# Patient Record
Sex: FEMALE | Race: WHITE | NOT HISPANIC OR LATINO | Employment: FULL TIME | URBAN - METROPOLITAN AREA
[De-identification: names, ages, dates, MRNs, and addresses within clinical notes are randomized per-mention and may not be internally consistent; named-entity substitution may affect disease eponyms.]

---
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
## 2021-01-11 ENCOUNTER — OFFICE VISIT (OUTPATIENT)
Dept: INTERNAL MEDICINE | Facility: CLINIC | Age: 21
End: 2021-01-11
Payer: COMMERCIAL

## 2021-01-11 ENCOUNTER — ANCILLARY PROCEDURE (OUTPATIENT)
Dept: GENERAL RADIOLOGY | Facility: CLINIC | Age: 21
End: 2021-01-11
Attending: NURSE PRACTITIONER
Payer: COMMERCIAL

## 2021-01-11 VITALS
SYSTOLIC BLOOD PRESSURE: 120 MMHG | DIASTOLIC BLOOD PRESSURE: 78 MMHG | HEART RATE: 91 BPM | BODY MASS INDEX: 28.12 KG/M2 | WEIGHT: 159.7 LBS | TEMPERATURE: 97.8 F | OXYGEN SATURATION: 97 %

## 2021-01-11 DIAGNOSIS — M79.605 PAIN OF LEFT LOWER EXTREMITY: ICD-10-CM

## 2021-01-11 DIAGNOSIS — M54.42 CHRONIC BILATERAL LOW BACK PAIN WITH LEFT-SIDED SCIATICA: ICD-10-CM

## 2021-01-11 DIAGNOSIS — G89.29 CHRONIC BILATERAL LOW BACK PAIN WITH LEFT-SIDED SCIATICA: ICD-10-CM

## 2021-01-11 DIAGNOSIS — Z23 NEED FOR PROPHYLACTIC VACCINATION AND INOCULATION AGAINST INFLUENZA: ICD-10-CM

## 2021-01-11 DIAGNOSIS — M54.42 CHRONIC BILATERAL LOW BACK PAIN WITH LEFT-SIDED SCIATICA: Primary | ICD-10-CM

## 2021-01-11 DIAGNOSIS — G89.29 CHRONIC BILATERAL LOW BACK PAIN WITH LEFT-SIDED SCIATICA: Primary | ICD-10-CM

## 2021-01-11 PROCEDURE — 90471 IMMUNIZATION ADMIN: CPT | Performed by: NURSE PRACTITIONER

## 2021-01-11 PROCEDURE — 99214 OFFICE O/P EST MOD 30 MIN: CPT | Mod: 25 | Performed by: NURSE PRACTITIONER

## 2021-01-11 PROCEDURE — 90686 IIV4 VACC NO PRSV 0.5 ML IM: CPT | Performed by: NURSE PRACTITIONER

## 2021-01-11 PROCEDURE — 72100 X-RAY EXAM L-S SPINE 2/3 VWS: CPT | Performed by: RADIOLOGY

## 2021-01-11 RX ORDER — GABAPENTIN 100 MG/1
100 CAPSULE ORAL
Qty: 60 CAPSULE | Refills: 0 | Status: SHIPPED | OUTPATIENT
Start: 2021-01-11 | End: 2021-02-10

## 2021-01-11 SDOH — HEALTH STABILITY: MENTAL HEALTH: HOW OFTEN DO YOU HAVE A DRINK CONTAINING ALCOHOL?: 2-4 TIMES A MONTH

## 2021-01-11 SDOH — HEALTH STABILITY: MENTAL HEALTH: HOW MANY STANDARD DRINKS CONTAINING ALCOHOL DO YOU HAVE ON A TYPICAL DAY?: 1 OR 2

## 2021-01-11 SDOH — HEALTH STABILITY: MENTAL HEALTH: HOW OFTEN DO YOU HAVE 6 OR MORE DRINKS ON ONE OCCASION?: NEVER

## 2021-01-11 NOTE — PATIENT INSTRUCTIONS
Go to radiology for x-ray of the low back (Lumbar)    Referral to Physical therapy; they will be contacting you    Will trial medication called Gabapentin 100 mg 1 capsule 30 minutes before bedtime x 3 days then increase to twice daily for back and leg pain.     Ok to take Tylenol (2 tablets every 8 hours) for breakthrough pain.    Flu shot given

## 2021-01-11 NOTE — PROGRESS NOTES
ASSESSMENT and PLAN:    Chronic bilateral low back pain with left-sided sciatica  - ordered Lumbar x-ray  - referral to PT  - trial Gabapentin 100 mg as directed  - ok to use Tylenol for breakthrough pain    Pain of left lower extremity  - trial Gabapentin 100 mg as directed  - ok to use Tylenol for breakthrough pain    Need for prophylactic vaccination and inoculation against influenza  - flu shot given    Patient instructions:    Go to radiology for x-ray of the low back (Lumbar)    Referral to Physical therapy; they will be contacting you    Will trial medication called Gabapentin 100 mg 1 capsule 30 minutes before bedtime x 3 days then increase to twice daily for back and leg pain.     Ok to take Tylenol (2 tablets every 8 hours) for breakthrough pain.    Flu shot given    Tiff BRENNER  [facility] Pratik Herring is a 20 year old who presents to clinic today for the following health issues  accompanied by herself:    HPI       She has pain in her back and in her legs with the left leg being the worse for a very long time. It has gotten worse over time. Last week her left leg was numb.    Reviewed PMH, SH, FH, and medications.  Being followed by Psychiatrist (new one).    See above.  Low back pain for some time but with some numbness going down left leg to mid calf x 1 week.  Taking Ibuprofen and Tylenol for pain helps some.  No injuries.  Stands a lot for work. Sleep ok.      Was worked up for the same complaints in 2019; saw Rheumatology due to KAREN positive and found not related to rheumatoid diseases.  Also had bilateral hip x-rays (normal).  Never went to the pain clinic as noted indicated referral.    No fever or cough.  No shortness of breathe or chest pain.  No stomach or urination issues.    Review of Systems   Constitutional, HEENT, cardiovascular, pulmonary, GI, , musculoskeletal, neuro, skin, endocrine and psych systems are negative, except as otherwise  noted.      Objective    /78 (BP Location: Right arm, Patient Position: Sitting, Cuff Size: Adult Regular)   Pulse 91   Temp 97.8  F (36.6  C) (Oral)   Wt 72.4 kg (159 lb 11.2 oz)   LMP  (LMP Unknown)   SpO2 97%   BMI 28.12 kg/m    Body mass index is 28.12 kg/m .     Physical Exam   GENERAL: alert and no distress  RESP: lungs clear to auscultation - no rales, rhonchi or wheezes  CV: regular rate and rhythm, normal S1 S2, no S3 or S4, no murmur, click or rub, no peripheral edema and peripheral pulses strong  MS: no gross musculoskeletal defects noted, no edema  SKIN: no suspicious lesions or rashes  BACK: mild left SI joint tenderness; no CVA tenderness, no paralumbar tenderness; no guarding with up and down movement.  SLR negative.  Good sensation and strength in lower legs

## 2021-01-14 ENCOUNTER — THERAPY VISIT (OUTPATIENT)
Dept: PHYSICAL THERAPY | Facility: CLINIC | Age: 21
End: 2021-01-14
Payer: COMMERCIAL

## 2021-01-14 DIAGNOSIS — M54.42 CHRONIC BILATERAL LOW BACK PAIN WITH LEFT-SIDED SCIATICA: ICD-10-CM

## 2021-01-14 DIAGNOSIS — M79.605 PAIN OF LEFT LOWER EXTREMITY: ICD-10-CM

## 2021-01-14 DIAGNOSIS — G89.29 CHRONIC BILATERAL LOW BACK PAIN WITH LEFT-SIDED SCIATICA: ICD-10-CM

## 2021-01-14 PROCEDURE — 97110 THERAPEUTIC EXERCISES: CPT | Mod: GP | Performed by: PHYSICAL THERAPIST

## 2021-01-14 PROCEDURE — 97161 PT EVAL LOW COMPLEX 20 MIN: CPT | Mod: GP | Performed by: PHYSICAL THERAPIST

## 2021-01-14 NOTE — PROGRESS NOTES
[facility] Initial Evaluation  Subjective:  Pt reports lumbar pain since childhood of unknown etiology. Pt has noted increased pain over the past 3 years. Pt referred by MD for physical therapy on 1-11-21    The history is provided by the patient. No  was used.   Therapist Generated HPI Evaluation         Type of problem:  Lumbar.    This is a chronic condition.  Condition occurred with:  Insidious onset.  Where condition occurred: for unknown reasons.  Patient reports pain:  Lumbar spine right and lumbar spine left.  Pain is described as aching and is constant.  Pain radiates to:  Gluteals left, thigh left, knee left, lower leg left and foot left. Pain is the same all the time.  Since onset symptoms are gradually worsening.  Associated symptoms:  Numbness, loss of motion/stiffness and loss of strength. Symptoms are exacerbated by lifting, sitting, carrying, standing and walking  and relieved by heat, analgesics and NSAID's (gabapentin).  Special tests included:  X-ray (see report).  Past treatment: none.   Restrictions due to condition include:  Working in normal job without restrictions.  Barriers include:  None as reported by patient.                        Objective:  Standing Alignment:        Lumbar deviations alignment: decreased lumbar lordosis.                Flexibility/Screens:       Lower Extremity:  Decreased left lower extremity flexibility:Hamstrings and Gastroc    Decreased right lower extremity flexibility:  Gastroc               Lumbar/SI Evaluation  ROM:    AROM Lumbar:   Flexion:          Minimal loss with pain  Ext:                    Moderate loss decreased lower extremity pain   Side Bend:        Left:  WNL    Right:  WNL  Rotation:           Left:     Right:   Side Glide:        Left:     Right:         Strength: weak pelvic stabilizers  Lumbar Myotomes:          L5 (Great Toe Ext): Left: 4        Lumbar DTR's:  normal        Lumbar Dermtomes:               L5 Left:  Hypo-light touch           Lumbar Palpation:  Palpation (lumbar): point tenderness L5-S1 spinous processes.                                                         General     ROS    Assessment/Plan:    Patient is a 20 year old female with lumbar complaints.    Patient has the following significant findings with corresponding treatment plan.                Diagnosis 1:  Chronic bilateral lumbar pain with left sided sciatica  Pain -  hot/cold therapy, self management, education, directional preference exercise and home program  Decreased ROM/flexibility - therapeutic exercise, therapeutic activity and home program  Decreased strength - therapeutic exercise, therapeutic activities and home program    Therapy Evaluation Codes:   1) History comprised of:   Personal factors that impact the plan of care:      Time since onset of symptoms.    Comorbidity factors that impact the plan of care are:      Depression, Implanted device, Mental illness, Numbness/tingling and Pain at night/rest.     Medications impacting care: Anti-depressant, Pain and gabapentin.  2) Examination of Body Systems comprised of:   Body structures and functions that impact the plan of care:      Lumbar spine.   Activity limitations that impact the plan of care are:      Sitting, Standing, Walking and Sleeping.  3) Clinical presentation characteristics are:   Stable/Uncomplicated.  4) Decision-Making    Low complexity using standardized patient assessment instrument and/or measureable assessment of functional outcome.  Cumulative Therapy Evaluation is: Low complexity.    Previous and current functional limitations:  (See Goal Flow Sheet for this information)    Short term and Long term goals: (See Goal Flow Sheet for this information)     Communication ability:  Patient appears to be able to clearly communicate and understand verbal and written communication and follow directions correctly.  Treatment Explanation - The following  has been discussed with the patient:   RX ordered/plan of care  Anticipated outcomes  Possible risks and side effects  This patient would benefit from PT intervention to resume normal activities.   Rehab potential is good.    Frequency:  1 X week, once daily  Duration:  for 6 weeks  Discharge Plan:  Achieve all LTG.  Independent in home treatment program.  Reach maximal therapeutic benefit.    Please refer to the daily flowsheet for treatment today, total treatment time and time spent performing 1:1 timed codes.

## 2021-01-22 ENCOUNTER — THERAPY VISIT (OUTPATIENT)
Dept: PHYSICAL THERAPY | Facility: CLINIC | Age: 21
End: 2021-01-22
Payer: COMMERCIAL

## 2021-01-22 DIAGNOSIS — M54.42 CHRONIC BILATERAL LOW BACK PAIN WITH LEFT-SIDED SCIATICA: ICD-10-CM

## 2021-01-22 DIAGNOSIS — G89.29 CHRONIC BILATERAL LOW BACK PAIN WITH LEFT-SIDED SCIATICA: ICD-10-CM

## 2021-01-22 DIAGNOSIS — M79.605 PAIN OF LEFT LOWER EXTREMITY: ICD-10-CM

## 2021-01-22 PROCEDURE — 97110 THERAPEUTIC EXERCISES: CPT | Mod: GP | Performed by: PHYSICAL THERAPIST

## 2021-01-22 PROCEDURE — 97530 THERAPEUTIC ACTIVITIES: CPT | Mod: GP | Performed by: PHYSICAL THERAPIST

## 2021-01-29 ENCOUNTER — THERAPY VISIT (OUTPATIENT)
Dept: PHYSICAL THERAPY | Facility: CLINIC | Age: 21
End: 2021-01-29
Payer: COMMERCIAL

## 2021-01-29 DIAGNOSIS — M54.42 CHRONIC BILATERAL LOW BACK PAIN WITH LEFT-SIDED SCIATICA: ICD-10-CM

## 2021-01-29 DIAGNOSIS — G89.29 CHRONIC BILATERAL LOW BACK PAIN WITH LEFT-SIDED SCIATICA: ICD-10-CM

## 2021-01-29 DIAGNOSIS — M79.605 PAIN OF LEFT LOWER EXTREMITY: ICD-10-CM

## 2021-01-29 PROCEDURE — 97530 THERAPEUTIC ACTIVITIES: CPT | Mod: GP | Performed by: PHYSICAL THERAPIST

## 2021-01-29 PROCEDURE — 97110 THERAPEUTIC EXERCISES: CPT | Mod: GP | Performed by: PHYSICAL THERAPIST

## 2021-01-29 NOTE — PROGRESS NOTES
Subjective:  HPI  Physical Exam                    Objective:  System    Physical Exam    General     ROS    Assessment/Plan:    SUBJECTIVE  Subjective changes as noted by pt:  Pt notes decreased pain in the lumbar region     Current pain level: 1/10     Changes in function:  Pt notes decreased pain at the end of her work shift     Adverse reaction to treatment or activity:  None    OBJECTIVE  Changes in objective findings:  Pt demonstrates improved hamstring flexibility. Lower abdominal strength improved.         ASSESSMENT  Nevaeh continues to require intervention to meet STG and LTG's: PT  Patient's symptoms are resolving.  Response to therapy has shown an improvement in  pain level, strength and function  Progress made towards STG/LTG?  Yes,     PLAN  Current treatment program is being advanced to more complex exercises.    PTA/ATC plan:  N/A    Please refer to the daily flowsheet for treatment today, total treatment time and time spent performing 1:1 timed codes.

## 2021-02-12 ENCOUNTER — THERAPY VISIT (OUTPATIENT)
Dept: PHYSICAL THERAPY | Facility: CLINIC | Age: 21
End: 2021-02-12
Payer: COMMERCIAL

## 2021-02-12 DIAGNOSIS — G89.29 CHRONIC BILATERAL LOW BACK PAIN WITH LEFT-SIDED SCIATICA: ICD-10-CM

## 2021-02-12 DIAGNOSIS — M54.42 CHRONIC BILATERAL LOW BACK PAIN WITH LEFT-SIDED SCIATICA: ICD-10-CM

## 2021-02-12 DIAGNOSIS — M79.605 PAIN OF LEFT LOWER EXTREMITY: ICD-10-CM

## 2021-02-12 PROCEDURE — 97110 THERAPEUTIC EXERCISES: CPT | Mod: GP | Performed by: PHYSICAL THERAPIST

## 2021-02-12 PROCEDURE — 97530 THERAPEUTIC ACTIVITIES: CPT | Mod: GP | Performed by: PHYSICAL THERAPIST

## 2021-02-12 NOTE — PROGRESS NOTES
Subjective:  HPI  Physical Exam                    Objective:  System    Physical Exam    General     ROS    Assessment/Plan:    SUBJECTIVE  Subjective changes as noted by pt:  Pt notes increased strength in the back     Current pain level: 1/10     Changes in function:  Pt notes decreased pain after standing at work      Adverse reaction to treatment or activity:  None    OBJECTIVE  Changes in objective findings:  Pt demonstrates improved pelvic stability and abdominal strength.         ASSESSMENT  Nevaeh continues to require intervention to meet STG and LTG's: PT  Patient's symptoms are resolving.  Response to therapy has shown an improvement in  strength  Progress made towards STG/LTG?  Yes,     PLAN  Current treatment program is being advanced to more complex exercises.    PTA/ATC plan:  N/A    Please refer to the daily flowsheet for treatment today, total treatment time and time spent performing 1:1 timed codes.

## 2021-04-19 DIAGNOSIS — G89.29 CHRONIC BILATERAL LOW BACK PAIN WITH LEFT-SIDED SCIATICA: ICD-10-CM

## 2021-04-19 DIAGNOSIS — M79.605 PAIN OF LEFT LOWER EXTREMITY: ICD-10-CM

## 2021-04-19 DIAGNOSIS — M54.42 CHRONIC BILATERAL LOW BACK PAIN WITH LEFT-SIDED SCIATICA: ICD-10-CM

## 2021-04-19 NOTE — TELEPHONE ENCOUNTER
Routing refill request to provider for review/approval because:  Drug not on the [facility], [facility] or [facility] refill protocol or controlled substance    Pattie B - Registered Nurse  [facility]  Acute and Diagnostic Services

## 2021-04-20 RX ORDER — GABAPENTIN 100 MG/1
CAPSULE ORAL
Qty: 60 CAPSULE | Refills: 1 | Status: SHIPPED | OUTPATIENT
Start: 2021-04-20 | End: 2021-07-14

## 2021-06-17 PROBLEM — G89.29 CHRONIC BILATERAL LOW BACK PAIN WITH LEFT-SIDED SCIATICA: Status: RESOLVED | Noted: 2021-01-14 | Resolved: 2021-06-17

## 2021-06-17 PROBLEM — M54.42 CHRONIC BILATERAL LOW BACK PAIN WITH LEFT-SIDED SCIATICA: Status: RESOLVED | Noted: 2021-01-14 | Resolved: 2021-06-17

## 2021-06-17 PROBLEM — M79.605 PAIN OF LEFT LOWER EXTREMITY: Status: RESOLVED | Noted: 2021-01-14 | Resolved: 2021-06-17

## 2021-06-17 NOTE — PROGRESS NOTES
Subjective:SUBJECTIVE  Subjective changes as noted by pt:  Pt notes increased strength in the back     Current pain level: 1/10     Changes in function:  Pt notes decreased pain after standing at work      Adverse reaction to treatment or activity:  None     OBJECTIVE  Changes in objective findings:  Pt demonstrates improved pelvic stability and abdominal strength  HPI  Physical Exam                    Objective:  System    Physical Exam    General     ROS    Assessment/Plan:    ASSESSMENT/PLAN  Updated problem list and treatment plan: Diagnosis 1:  Lumbar pain  Pain -  hot/cold therapy, self management, education and home program  Decreased ROM/flexibility - therapeutic exercise, therapeutic activity and home program  Decreased strength - therapeutic exercise, therapeutic activities and home program  Progress toward STG/LTGs have been made:  Yes,   Assessment of Progress: The patient's condition is improving.  Self Management Plans:  Patient has been instructed in a home treatment program.  I have re-evaluated this patient and find that the nature, scope, duration and intensity of the therapy is appropriate for the medical condition of the patient.  Nevaeh continues to require the following intervention to meet STG and LT's:  PT    Recommendations:  Pt has not returned for treatment since 2-21-21. Pt discharged at this time.      Please refer to the daily flowsheet for treatment today, total treatment time and time spent performing 1:1 timed codes.

## 2021-07-12 DIAGNOSIS — M79.605 PAIN OF LEFT LOWER EXTREMITY: ICD-10-CM

## 2021-07-12 DIAGNOSIS — M54.42 CHRONIC BILATERAL LOW BACK PAIN WITH LEFT-SIDED SCIATICA: ICD-10-CM

## 2021-07-12 DIAGNOSIS — G89.29 CHRONIC BILATERAL LOW BACK PAIN WITH LEFT-SIDED SCIATICA: ICD-10-CM

## 2021-07-14 RX ORDER — GABAPENTIN 100 MG/1
CAPSULE ORAL
Qty: 60 CAPSULE | Refills: 1 | Status: SHIPPED | OUTPATIENT
Start: 2021-07-14 | End: 2021-09-01

## 2021-07-14 NOTE — TELEPHONE ENCOUNTER
gabapentin (NEURONTIN) 100 MG capsule        Last Written Prescription Date:  4/20/2021  Last Fill Quantity: 60,   # refills: 1  Last Office Visit: 1/11/2021  Future Office visit:       Routing refill request to provider for review/approval because:  Drug not on the [facility], [facility] or [facility] refill protocol or controlled substance    Sandra Serrano RN

## 2021-07-30 ENCOUNTER — TRANSFERRED RECORDS (OUTPATIENT)
Dept: HEALTH INFORMATION MANAGEMENT | Facility: CLINIC | Age: 21
End: 2021-07-30

## 2021-08-09 ENCOUNTER — TRANSFERRED RECORDS (OUTPATIENT)
Dept: HEALTH INFORMATION MANAGEMENT | Facility: CLINIC | Age: 21
End: 2021-08-09

## 2021-08-09 ENCOUNTER — OFFICE VISIT (OUTPATIENT)
Dept: URGENT CARE | Facility: URGENT CARE | Age: 21
End: 2021-08-09
Payer: COMMERCIAL

## 2021-08-09 VITALS
BODY MASS INDEX: 26.41 KG/M2 | OXYGEN SATURATION: 99 % | DIASTOLIC BLOOD PRESSURE: 66 MMHG | SYSTOLIC BLOOD PRESSURE: 120 MMHG | RESPIRATION RATE: 20 BRPM | WEIGHT: 150 LBS | TEMPERATURE: 99.4 F | HEART RATE: 70 BPM

## 2021-08-09 DIAGNOSIS — R19.7 DIARRHEA, UNSPECIFIED TYPE: Primary | ICD-10-CM

## 2021-08-09 PROCEDURE — U0005 INFEC AGEN DETEC AMPLI PROBE: HCPCS | Performed by: PHYSICIAN ASSISTANT

## 2021-08-09 PROCEDURE — U0003 INFECTIOUS AGENT DETECTION BY NUCLEIC ACID (DNA OR RNA); SEVERE ACUTE RESPIRATORY SYNDROME CORONAVIRUS 2 (SARS-COV-2) (CORONAVIRUS DISEASE [COVID-19]), AMPLIFIED PROBE TECHNIQUE, MAKING USE OF HIGH THROUGHPUT TECHNOLOGIES AS DESCRIBED BY CMS-2020-01-R: HCPCS | Performed by: PHYSICIAN ASSISTANT

## 2021-08-09 PROCEDURE — 99213 OFFICE O/P EST LOW 20 MIN: CPT | Performed by: PHYSICIAN ASSISTANT

## 2021-08-09 NOTE — LETTER
[facility]  [address] [address]  [phone]      August 9, 2021    RE:  Nevaeh Mccann                                                                                                                                                       [address]  [facility] [address]            To whom it may concern:    Nevaeh Mccann was seen in clinic today.  Covid testing was done.  She will follow CDC's guidance on return to work after covid testing with symptoms.       Sincerely,        Sade Prakash PA-C    [facility]

## 2021-08-10 ENCOUNTER — LAB (OUTPATIENT)
Dept: LAB | Facility: CLINIC | Age: 21
End: 2021-08-10
Payer: COMMERCIAL

## 2021-08-10 DIAGNOSIS — R19.7 DIARRHEA, UNSPECIFIED TYPE: ICD-10-CM

## 2021-08-10 LAB — SARS-COV-2 RNA RESP QL NAA+PROBE: NEGATIVE

## 2021-08-10 PROCEDURE — 87506 IADNA-DNA/RNA PROBE TQ 6-11: CPT

## 2021-08-10 NOTE — PATIENT INSTRUCTIONS
"  Patient Education   After Your COVID-19 (Coronavirus) Test  You have been tested for COVID-19 (coronavirus).   If you'll have surgery in the next few days, we'll let you know ahead of time if you have the virus. Please call your surgeon's office with any questions.  For all other patients: Results are usually available in [employer] within 2 to 3 days.   If you do not have a [employer] account, you'll get a letter in the mail in about 7 to 10 days.   [employer] is often the fastest way to get test results. Please sign up if you do not already have a [employer] account. See the handout Getting COVID-19 Test Results in [employer] for help.  What if my test result is positive?  If your test is positive and you have not viewed your result in [employer], you'll get a phone call with your result. (A positive test means that you have the virus.)     Follow the tips under \"How do I self-isolate?\" below for 10 days (20 days if you have a weak immune system).    You don't need to be retested for COVID-19 before going back to school or work. As long as you're fever-free and feeling better, you can go back to school, work and other activities after waiting the 10 or 20 days.  What if I have questions after I get my results?  If you have questions about your results, please visit our testing website at [email or web address].   After 7 to 10 days, if you have not gotten your results:     Call [phone] ([phone]) and ask to speak with our COVID-19 results team.    If you're being treated at an infusion center: Call your infusion center directly.  What are the symptoms of COVID-19?  Cough, fever and trouble breathing are the most common signs of COVID-19.  Other symptoms can include new headaches, new muscle or body aches, new and unexplained fatigue (feeling very tired), chills, sore throat, congestion (stuffy or runny nose), diarrhea (loose poop), loss of taste or smell, belly pain, and nausea or vomiting " "(feeling sick to your stomach or throwing up).  You may already have symptoms of COVID-19, or they may show up later.  What should I do if I have symptoms?  If you're having surgery: Call your surgeon's office.  For all other patients: Stay home and away from others (self-isolate) until ...    You've had no fever--and no medicine that reduces fever--for 1 full day (24 hours), AND    Other symptoms have gotten better. For example, your cough or breathing has improved, AND    At least 10 days have passed since your symptoms first started.  How do I self-isolate?    Stay in your own room, even for meals. Use your own bathroom if you can.    Stay away from others in your home. No hugging, kissing or shaking hands. No visitors.    Don't go to work, school or anywhere else.    Clean \"high touch\" surfaces often (doorknobs, counters, handles). Use household cleaning spray or wipes. You'll find a full list of  on the EPA website: www.epa.gov/pesticide-registration/list-n-disinfectants-use-against-sars-cov-2.    Cover your mouth and nose with a mask or other face covering to avoid spreading germs.    Wash your hands and face often. Use soap and water.    Caregivers in these groups are at risk for severe illness due to COVID-19:  ? People 65 years and older  ? People who live in a nursing home or long-term care facility  ? People with chronic disease (lung, heart, cancer, diabetes, kidney, liver, immunologic)  ? People who have a weakened immune system, including those who:    Are in cancer treatment    Take medicine that weakens the immune system, such as corticosteroids    Had a bone marrow or organ transplant    Have an immune deficiency    Have poorly controlled HIV or AIDS    Are obese (body mass index of 40 or higher)    Smoke regularly    Caregivers should wear gloves while washing dishes, handling laundry and cleaning bedrooms and bathrooms.    Use caution when washing and drying laundry: Don't shake dirty " laundry and use the warmest water setting that you can.    For more tips on managing your health at home, go to www.cdc.gov/coronavirus/2019-ncov/downloads/10Things.pdf.  How can I take care of myself at home?  1. Get lots of rest. Drink extra fluids (unless a doctor has told you not to).  2. Take Tylenol (acetaminophen) for fever or pain. If you have liver or kidney problems, ask your family doctor if it's OK to take Tylenol.   Adults can take either:  ? 650 mg (two 325 mg pills) every 4 to 6 hours, or   ? 1,000 mg (two 500 mg pills) every 8 hours as needed.  ? Note: Don't take more than 3,000 mg in one day. Acetaminophen is found in many medicines (both prescribed and over-the-counter medicines). Read all labels to be sure you don't take too much.   For children, check the Tylenol bottle for the right dose. The dose is based on the child's age or weight.  3. If you have other health problems (like cancer, heart failure, an organ transplant or severe kidney disease): Call your specialty clinic if you don't feel better in the next 2 days.  4. Know when to call 911. Emergency warning signs include:  ? Trouble breathing or shortness of breath  ? Chest pain or pressure that doesn't go away  ? Feeling confused like you haven't felt before, or not being able to wake up  ? Bluish-colored lips or face  5. If your doctor prescribed a blood thinner medicine: Follow their instructions.  Where can I get more information?    [facility] - About COVID-19:   [email or web address]    CDC - If You're Sick: cdc.gov/coronavirus/2019-ncov/about/steps-when-sick.html    CDC - Ending Home Isolation: www.cdc.gov/coronavirus/2019-ncov/hcp/disposition-in-home-patients.html    CDC - Caring for Someone: www.cdc.gov/coronavirus/2019-ncov/if-you-are-sick/care-for-someone.html    [facility] - Interim Guidance for Hospital Discharge to Home: www.health.[facility].[place].us/diseases/coronavirus/hcp/hospdischarge.pdf    [facility]  clinical trials (COVID-19 research studies): clinicalaffairs.[facility]-clinical-trials    Below are the COVID-19 hotlines at the [place] Department of Health ([facility]). Interpreters are available.  ? For health questions: Call 908-765-6883 or 1-800.893.8203 (7 a.m. to 7 p.m.)  ? For questions about schools and childcare: Call 814-423-8565 or 1-957.233.9841 (7 a.m. to 7 p.m.)    For informational purposes only. Not to replace the advice of your health care provider. Clinically reviewed by Infection Prevention and [facility] COVID-19 Clinical Team. Copyright   2020 [employer]. All rights reserved. [employer] [id number] - Rev 11/11/20.       Patient Education     Diarrhea with Uncertain Cause (Adult)    Diarrhea is when stools are loose and watery. This can be caused by:    Viral infections    Bacterial infections    Food poisoning    Parasites    Irritable bowel syndrome (IBS)    Inflammatory bowel diseases such as ulcerative colitis, Crohn's disease, and celiac disease    Food intolerance, such as to lactose, the sugar found in milk and milk products    Reaction to medicines like antibiotics, laxatives, cancer drugs, and antacids  Along with diarrhea, you may also have:    Abdominal pain and cramping    Nausea and vomiting    Loss of bowel control    Fever and chills    Bloody stools  In some cases, antibiotics may help to treat diarrhea. You may have a stool sample test. This is done to see what is causing your diarrhea, and if antibiotics will help treat it. The results of a stool sample test may take up to 2 days. The healthcare provider may not give you antibiotics until he or she has the stool test results.  Diarrhea can cause dehydration. This is the loss of too much water and other fluids from the body. When this occurs, body fluid must be replaced. This can be done with oral rehydration solutions. Oral rehydration solutions are available at drugstores and grocery stores without a  prescription. Sports drinks are not the best choice if you are very dehydrated. They have too much sugar and not enough electrolytes.  Home care  Follow all instructions given by your healthcare provider. Rest at home for the next 24 hours, or until you feel better. Avoid caffeine, tobacco, and alcohol. These can make diarrhea, cramping, and pain worse.  If taking medicines:    Over-the-counter nausea and diarrhea medicines are generally OK unless you experience fever or blood stool. Check with your doctor first in those circumstances.    You may use acetaminophen or NSAID medicines like ibuprofen or naproxen to reduce pain and fever. Don t use these if you have chronic liver or kidney disease, or ever had a stomach ulcer or gastrointestinal bleeding. Don't use NSAID medicines if you are already taking one for another condition (like arthritis) or are on daily aspirin therapy (such as for heart disease or after a stroke). Talk with your healthcare provider first.    If antibiotics were prescribed, be sure you take them until they are finished. Don t stop taking them even when you feel better. Antibiotics must be taken as a full course.  To prevent the spread of illness:    Remember that washing with soap and water and using alcohol-based  is the best way to prevent the spread of infection. Dry your hands with a single use towel (like a paper towel).    Clean the toilet after each use.    Wash your hands before eating.    Wash your hands before and after preparing food. Keep in mind that people with diarrhea or vomiting should not prepare food for others.    Wash your hands after using cutting boards, countertops, and knives that have been in contact with raw foods.    Wash and then peel fruits and vegetables.    Keep uncooked meats away from cooked and ready-to-eat foods.    Use a food thermometer when cooking. Cook poultry to at least 165 F (74 C). Cook ground meat (beef, veal, pork, lamb) to at least  160 F (71 C). Cook fresh beef, veal, lamb, and pork to at least 145 F (63 C).    Don t eat raw or undercooked eggs (poached or nam side up), poultry, meat, or unpasteurized milk and juices.  Food and drinks  The main goal while treating vomiting or diarrhea is to prevent dehydration. This is done by taking small amounts of liquids often.    Keep in mind that liquids are more important than food right now.    Drink only small amounts of liquids at a time.    Don t force yourself to eat, especially if you are having cramping, vomiting, or diarrhea. Don t eat large amounts at a time, even if you are hungry.    If you eat, avoid fatty, greasy, spicy, or fried foods.    Don t eat dairy foods or drink milk if you have diarrhea. These can make diarrhea worse.  During the first 24 hours you can try:    Oral rehydration solutions.  Sports drinks may be used if you are not too dehydrated and are otherwise healthy.    Soft drinks without caffeine    Ginger ale    Water (plain or flavored)    Decaf tea or coffee    Clear broth, consommé, or bouillon    Gelatin, popsicles, or frozen fruit juice bars  The second 24 hours, if you are feeling better, you can add:    Hot cereal, plain toast, bread, rolls, or crackers    Plain noodles, rice, mashed potatoes, chicken noodle soup, or rice soup    Unsweetened canned fruit (no pineapple)    Bananas  As you recover:    Limit fat intake to less than 15 grams per day. Don t eat margarine, butter, oils, mayonnaise, sauces, gravies, fried foods, peanut butter, meat, poultry, or fish.    Limit fiber. Don t eat raw or cooked vegetables, fresh fruits except bananas, or bran cereals.    Limit caffeine and chocolate.    Limit dairy.    Don t use spices or seasonings except salt.    Go back to your normal diet over time, as you feel better and your symptoms improve.    If the symptoms come back, go back to a simple diet or clear liquids.  Follow-up care  Follow up with your healthcare provider,  or as advised. If a stool sample was taken or cultures were done, call the healthcare provider for the results as instructed.  Call 911  Call 911 if you have any of these symptoms:    Trouble breathing    Confusion    Extreme drowsiness or trouble walking    Loss of consciousness    Rapid heart rate    Chest pain    Stiff neck    Seizure  When to seek medical advice  Call your healthcare provider right away if any of these occur:    Abdominal pain that gets worse    Constant lower right abdominal pain    Continued vomiting and inability to keep liquids down    Diarrhea more than 5 times a day    Blood in vomit or stool    Dark urine or no urine for 8 hours, dry mouth and tongue, tiredness, weakness, or dizziness    Drowsiness    New rash    You don t get better in 2 to 3 days    Fever of 100.4 F (38 C) or higher, or as directed by your healthcare provider  Karlie last reviewed this educational content on 6/1/2018 2000-2021 [employer]. All rights reserved. This information is not intended as a substitute for professional medical care. Always follow your healthcare professional's instructions.

## 2021-08-10 NOTE — PROGRESS NOTES
Patient presents with:  Diarrhea: for 2 weeks, and some abdominal cramping. In last week had 2 COVID tests and both were negative  Nausea: and occ vom    (R19.7) Diarrhea, unspecified type  (primary encounter diagnosis)  Comment:   Plan: Enteric Bacteria and Virus Panel by SCOTTY Stool,         Symptomatic COVID-19 Virus (Coronavirus) by PCR        Nasopharyngeal, Nursing Communication 1          Follow covid isolation guidelines.    See handout on Covid testing and Diarrhea.    F/u with PCP should symptoms persist or worsen.            SUBJECTIVE:   Nevaeh Mccann is a 21 year old female who presents today with diarrhea and nausea with occasional vomiting.      8-10 episodes of diarrhea daily for the past 4 days.  Onset of diarrhea was 2 weeks ago.      Low grade fever.    Last covid test was today the test was rapid.    Also had a covid test just over a week ago when it started.      Has been around Covid at her work.      Vomited once today    No travel        Past Medical History:   Diagnosis Date     Anxiety      Depression      IUD (intrauterine device) in place 08/2017    Kyleena IUD     Polysubstance abuse (H)     started at age 15 years     PTSD (post-traumatic stress disorder)      Self-injurious behavior          Current Outpatient Medications   Medication Sig Dispense Refill     Multiple Vitamins-Iron (DAILY-JOANNA/IRON/BETA-CAROTENE) TABS TAKE 1 TABLET BY MOUTH DAILY. (Patient not taking: Reported on 10/19/2020) 30 tablet 7     Social History     Tobacco Use     Smoking status: Never Smoker     Smokeless tobacco: Never Used   Substance Use Topics     Alcohol use: Not on file     Family History   Problem Relation Age of Onset     Diabetes Mother      Diabetes Father          ROS:    10 point ROS of systems including Constitutional, Eyes, Respiratory, Cardiovascular, Gastroenterology, Genitourinary, Integumentary, Muscularskeletal, Psychiatric ,neurological were all negative except for pertinent positives  noted in my HPI       OBJECTIVE:  /66   Pulse 70   Temp 99.4  F (37.4  C)   Resp 20   Wt 68 kg (150 lb)   LMP  (LMP Unknown)   SpO2 99%   BMI 26.41 kg/m    Physical Exam:  GENERAL APPEARANCE: healthy, alert and no distress  EYES: EOMI,  PERRL, conjunctiva clear  HENT: ear canals and TM's normal.  Nose and mouth without ulcers, erythema or lesions  NECK: supple, nontender, no lymphadenopathy  RESP: lungs clear to auscultation - no rales, rhonchi or wheezes  CV: regular rates and rhythm, normal S1 S2, no murmur noted  ABDOMEN:  soft, nontender, no HSM or masses and bowel sounds normal  NEURO: Normal strength and tone, sensory exam grossly normal,  normal speech and mentation  SKIN: no suspicious lesions or rashes

## 2021-08-16 PROCEDURE — 96374 THER/PROPH/DIAG INJ IV PUSH: CPT

## 2021-08-16 PROCEDURE — 93005 ELECTROCARDIOGRAM TRACING: CPT

## 2021-08-16 PROCEDURE — 99284 EMERGENCY DEPT VISIT MOD MDM: CPT | Mod: 25

## 2021-08-17 ENCOUNTER — HOSPITAL ENCOUNTER (EMERGENCY)
Facility: CLINIC | Age: 21
Discharge: HOME | End: 2021-08-17
Attending: EMERGENCY MEDICINE | Admitting: EMERGENCY MEDICINE
Payer: COMMERCIAL

## 2021-08-17 VITALS
HEART RATE: 75 BPM | SYSTOLIC BLOOD PRESSURE: 109 MMHG | TEMPERATURE: 98.6 F | RESPIRATION RATE: 18 BRPM | DIASTOLIC BLOOD PRESSURE: 67 MMHG | OXYGEN SATURATION: 100 %

## 2021-08-17 DIAGNOSIS — R10.9 ABDOMINAL PAIN, UNSPECIFIED ABDOMINAL LOCATION: ICD-10-CM

## 2021-08-17 DIAGNOSIS — R42 LIGHTHEADEDNESS: ICD-10-CM

## 2021-08-17 LAB
ALBUMIN SERPL-MCNC: 4.2 G/DL (ref 3.4–5)
ALBUMIN UR-MCNC: NEGATIVE MG/DL
ALP SERPL-CCNC: 63 U/L (ref 40–150)
ALT SERPL W P-5'-P-CCNC: 19 U/L (ref 0–50)
ANION GAP SERPL CALCULATED.3IONS-SCNC: 3 MMOL/L (ref 3–14)
APPEARANCE UR: CLEAR
AST SERPL W P-5'-P-CCNC: 10 U/L (ref 0–45)
ATRIAL RATE - MUSE: 75 BPM
BASOPHILS # BLD AUTO: 0 10E3/UL (ref 0–0.2)
BASOPHILS NFR BLD AUTO: 0 %
BILIRUB SERPL-MCNC: 0.2 MG/DL (ref 0.2–1.3)
BILIRUB UR QL STRIP: NEGATIVE
BUN SERPL-MCNC: 9 MG/DL (ref 7–30)
CALCIUM SERPL-MCNC: 9.2 MG/DL (ref 8.5–10.1)
CHLORIDE BLD-SCNC: 109 MMOL/L (ref 94–109)
CO2 SERPL-SCNC: 29 MMOL/L (ref 20–32)
COLOR UR AUTO: ABNORMAL
CREAT SERPL-MCNC: 0.64 MG/DL (ref 0.52–1.04)
DIASTOLIC BLOOD PRESSURE - MUSE: NORMAL MMHG
EOSINOPHIL # BLD AUTO: 0 10E3/UL (ref 0–0.7)
EOSINOPHIL NFR BLD AUTO: 1 %
ERYTHROCYTE [DISTWIDTH] IN BLOOD BY AUTOMATED COUNT: 11.1 % (ref 10–15)
GFR SERPL CREATININE-BSD FRML MDRD: >90 ML/MIN/1.73M2
GLUCOSE BLD-MCNC: 85 MG/DL (ref 70–99)
GLUCOSE UR STRIP-MCNC: NEGATIVE MG/DL
HCT VFR BLD AUTO: 41.4 % (ref 35–47)
HGB BLD-MCNC: 13.8 G/DL (ref 11.7–15.7)
HGB UR QL STRIP: ABNORMAL
IMM GRANULOCYTES # BLD: 0 10E3/UL
IMM GRANULOCYTES NFR BLD: 0 %
INTERPRETATION ECG - MUSE: NORMAL
KETONES UR STRIP-MCNC: NEGATIVE MG/DL
LEUKOCYTE ESTERASE UR QL STRIP: ABNORMAL
LIPASE SERPL-CCNC: 65 U/L (ref 73–393)
LYMPHOCYTES # BLD AUTO: 2.5 10E3/UL (ref 0.8–5.3)
LYMPHOCYTES NFR BLD AUTO: 31 %
MCH RBC QN AUTO: 30.9 PG (ref 26.5–33)
MCHC RBC AUTO-ENTMCNC: 33.3 G/DL (ref 31.5–36.5)
MCV RBC AUTO: 93 FL (ref 78–100)
MONOCYTES # BLD AUTO: 0.6 10E3/UL (ref 0–1.3)
MONOCYTES NFR BLD AUTO: 7 %
MUCOUS THREADS #/AREA URNS LPF: PRESENT /LPF
NEUTROPHILS # BLD AUTO: 5 10E3/UL (ref 1.6–8.3)
NEUTROPHILS NFR BLD AUTO: 61 %
NITRATE UR QL: NEGATIVE
NRBC # BLD AUTO: 0 10E3/UL
NRBC BLD AUTO-RTO: 0 /100
P AXIS - MUSE: 43 DEGREES
PH UR STRIP: 6.5 [PH] (ref 5–7)
PLATELET # BLD AUTO: 266 10E3/UL (ref 150–450)
POTASSIUM BLD-SCNC: 3.7 MMOL/L (ref 3.4–5.3)
PR INTERVAL - MUSE: 140 MS
PROT SERPL-MCNC: 7.3 G/DL (ref 6.8–8.8)
QRS DURATION - MUSE: 84 MS
QT - MUSE: 374 MS
QTC - MUSE: 417 MS
R AXIS - MUSE: 77 DEGREES
RBC # BLD AUTO: 4.47 10E6/UL (ref 3.8–5.2)
RBC URINE: 3 /HPF
SODIUM SERPL-SCNC: 141 MMOL/L (ref 133–144)
SP GR UR STRIP: 1.02 (ref 1–1.03)
SQUAMOUS EPITHELIAL: 5 /HPF
SYSTOLIC BLOOD PRESSURE - MUSE: NORMAL MMHG
T AXIS - MUSE: 57 DEGREES
UROBILINOGEN UR STRIP-MCNC: NORMAL MG/DL
VENTRICULAR RATE- MUSE: 75 BPM
WBC # BLD AUTO: 8.1 10E3/UL (ref 4–11)
WBC URINE: 1 /HPF

## 2021-08-17 PROCEDURE — 36415 COLL VENOUS BLD VENIPUNCTURE: CPT | Performed by: EMERGENCY MEDICINE

## 2021-08-17 PROCEDURE — 250N000013 HC RX MED GY IP 250 OP 250 PS 637: Performed by: EMERGENCY MEDICINE

## 2021-08-17 PROCEDURE — 82247 BILIRUBIN TOTAL: CPT | Performed by: EMERGENCY MEDICINE

## 2021-08-17 PROCEDURE — 83690 ASSAY OF LIPASE: CPT | Performed by: EMERGENCY MEDICINE

## 2021-08-17 PROCEDURE — 85025 COMPLETE CBC W/AUTO DIFF WBC: CPT | Performed by: EMERGENCY MEDICINE

## 2021-08-17 PROCEDURE — 250N000009 HC RX 250: Performed by: EMERGENCY MEDICINE

## 2021-08-17 PROCEDURE — 81001 URINALYSIS AUTO W/SCOPE: CPT | Performed by: EMERGENCY MEDICINE

## 2021-08-17 PROCEDURE — 258N000003 HC RX IP 258 OP 636: Performed by: EMERGENCY MEDICINE

## 2021-08-17 RX ORDER — ACETAMINOPHEN 500 MG
1000 TABLET ORAL ONCE
Status: COMPLETED | OUTPATIENT
Start: 2021-08-17 | End: 2021-08-17

## 2021-08-17 RX ORDER — FAMOTIDINE 20 MG/1
20 TABLET, FILM COATED ORAL DAILY
Qty: 10 TABLET | Refills: 0 | Status: SHIPPED | OUTPATIENT
Start: 2021-08-17 | End: 2021-08-19

## 2021-08-17 RX ORDER — ONDANSETRON 4 MG/1
4 TABLET, ORALLY DISINTEGRATING ORAL EVERY 8 HOURS PRN
Qty: 10 TABLET | Refills: 0 | Status: SHIPPED | OUTPATIENT
Start: 2021-08-17 | End: 2021-08-20

## 2021-08-17 RX ORDER — SUCRALFATE 1 G/1
1 TABLET ORAL 4 TIMES DAILY
Qty: 20 TABLET | Refills: 0 | Status: SHIPPED | OUTPATIENT
Start: 2021-08-17 | End: 2021-08-19

## 2021-08-17 RX ADMIN — LIDOCAINE HYDROCHLORIDE 30 ML: 20 SOLUTION ORAL; TOPICAL at 01:11

## 2021-08-17 RX ADMIN — ACETAMINOPHEN 1000 MG: 500 TABLET, FILM COATED ORAL at 01:31

## 2021-08-17 RX ADMIN — SODIUM CHLORIDE 1000 ML: 9 INJECTION, SOLUTION INTRAVENOUS at 01:11

## 2021-08-17 RX ADMIN — FAMOTIDINE 20 MG: 10 INJECTION, SOLUTION INTRAVENOUS at 01:11

## 2021-08-17 ASSESSMENT — ENCOUNTER SYMPTOMS
NAUSEA: 1
ABDOMINAL PAIN: 1
DIARRHEA: 1
DIZZINESS: 1
CONFUSION: 1
PALPITATIONS: 1
LIGHT-HEADEDNESS: 1
BLOOD IN STOOL: 0
VOMITING: 0
DYSURIA: 0

## 2021-08-17 NOTE — ED PROVIDER NOTES
History   Chief Complaint:  Abdominal Pain       History is provided by the patient.    CHANDRAKANT Mccann is a 21 year old female with history of polysubstance abuse who presents with myriad of complaints. She states that for the last couple weeks she has been having dull, cramping, epigastric abdominal pains with associated nausea and occasional diarrhea. She says the nausea is worse after eating. Today the abdominal pains worsened and have felt sharp. However, she presents to the ED tonight after having heart palpitations, lightheadedness, dizziness, and general confusion while she was with her friends. She states the palpitations, dizziness and confusion have resolved.  She denies any chest pains, vomiting,  blood in stool, or dysuria. She was recently feeling sick with a cold and had gotten COVID tested twice in the last 2 weeks, both resulting negatively. She is COVID vaccinated. She takes Tylenol and Advil daily. Her last period was 2 weeks ago and she has an IUD.  No ETOH or drug use today though she does admit to marijuana use.     Review of Systems   Cardiovascular: Positive for palpitations. Negative for chest pain.   Gastrointestinal: Positive for abdominal pain (epigastric), diarrhea and nausea. Negative for blood in stool and vomiting.   Genitourinary: Negative for dysuria.   Neurological: Positive for dizziness (resolved) and light-headedness (resolved).   Psychiatric/Behavioral: Positive for confusion (resolved).   All other systems reviewed and are negative.    Allergies:  Azithromycin  Penicillins  Sulfamethoxazole with Trimethoprim    Medications:  Neurontin  Levonorgestrel  Effexor-ER  Cholecalciferol  Buspar    Past Medical History:    Anxiety  Depression  IUD  Polysubstance abuse  PTSD  Self injurious behavior  Genital warts  Suicide attempt  Tylenol overdose   Seborrheic dermatitis  Orthostasis    Past Surgical History:    Tonsillectomy     Family History:    Diabetes  Asthma  Atrial  fibrillation  Migraines  Fibromyalgia  Alcoholism  Brain cancer  Suicide    Social History:  Patient was brought to the ED by her friends.  Patient affirms mairjuana use.  Patient affirms occasional alcohol use. She denies any usage today.    Physical Exam     Patient Vitals for the past 24 hrs:   BP Temp Temp src Pulse Resp SpO2   08/17/21 0115 -- -- -- 75 -- 100 %   08/17/21 0100 109/67 -- -- 62 -- 99 %   08/17/21 0045 117/79 -- -- 78 -- 97 %   08/16/21 2359 139/85 98.6  F (37  C) Oral 99 18 98 %       Physical Exam  General: Well-nourished, nontoxic  Eyes: PERRL, EOMI, no nystagmus.  No scleral icterus or conjunctival injection.    ENT:  Moist mucus membranes, posterior oropharynx clear without erythema or exudates. TM normal bilaterally  Neck: Supple with full range of motion  Respiratory:  Lungs clear to auscultation bilaterally, no crackles/rubs/wheezes.  Good air movement  CV: Normal rate and rhythm, no murmurs/rubs/gallops  GI:  Abdomen soft and non-distended.  No tenderness, guarding or rebound  Skin: Warm, dry.  No rashes or petechiae  MSK: No peripheral edema or calf tenderness  Neuro: Alert and oriented to person/place/time.  No aphasia/facial droop/dysarthria.  Tongue midline, normal strength at SCM/trapezius/BUE/BLE.  Normal finger to nose. Normal gait.  Sensation intact over face/BUE/BLE  Psychiatric: Flat affect      Emergency Department Course     ECG  ECG taken at 0045, ECG read at 0046  Normal sinus rhythm  Normal ECG   Rate 75 bpm. NE interval 140 ms. QRS duration 84 ms. QT/QTc 374/417 ms. P-R-T axes 43 77 57.     Laboratory:    CBC: WBC 8.1, HGB 13.8,      CMP: AWNL (Creatinine 0.64)     Lipase: 65 (L)    UA with microscopic: Blood Small (A), Leukocyte Esterase Trace (A), Mucus Present (A), RBC 3 (H), Squamous Epithelial 5 (H), o/w WNL     Emergency Department Course:    Reviewed:  I reviewed nursing notes, vitals, past medical history and care everywhere    Assessments:  0028 I obtained  history and examined the patient as noted above.   0121 I rechecked the patient and explained findings.     Interventions:  0111 Pepcid 20 mg IV  0111 NS 1000 mL IV  0111 GI cocktail 30 mL PO   Tylenol 1000 mg PO    Disposition:  The patient was discharged to home.       Impression & Plan     Medical Decision Making:  Patient is a 21-year-old female presenting with a myriad of complaints including abdominal pain, nausea, lightheadedness and diarrhea.  She has had recent negative COVID 19 tests.  She is nontoxic, clinically well-hydrated.  She has no significant abdominal tenderness on exam and I doubt intra-abdominal catastrophe including cholecystitis, appendicitis, small bowel obstruction.  I do not feel emergent imaging is warranted at this point time.  Labs overall reassuring without profound electrolyte derangement or anemia.  The patient is not pregnant.  LFTs/lipase reassuring, I doubt gallbladder/biliary etiology.  Patient does point that greatest pain is in the epigastric region.  She did report some symptom improvement after IV Pepcid and GI cocktail.  I did discuss her presentation may be secondary to mild gastritis/PUD.  I will initiate Pepcid and Carafate on dispo.  Dietary recommendations discussed.  Patient had a multitude of other complaints including palpitations, lightheadedness and confusion.  She is neuro intact and denies any concerning headache.  I doubt serious neurologic sequalae.  EKG without ischemic changes, Brugada, WPW, or prolonged QTC.  She denies any active chest pain and I do not feel further emergent work-up is warranted at this point time.  I do suspect some component of anxiety is contributing to her presentation today.  Planning close outpatient follow-up. All questions addressed as well as return precautions.    Covid-19  Nevaeh Mccann was evaluated during a global COVID-19 pandemic, which necessitated consideration that the patient might be at risk for infection with the  SARS-CoV-2 virus that causes COVID-19.   Applicable protocols for evaluation were followed during the patient's care.   COVID-19 was considered as part of the patient's evaluation. The plan for testing is:  a test was obtained at a previous visit and reviewed & considered today.    Diagnosis:    ICD-10-CM    1. Abdominal pain, unspecified abdominal location  R10.9    2. Lightheadedness  R42        Discharge Medications:  New Prescriptions    FAMOTIDINE (PEPCID) 20 MG TABLET    Take 1 tablet (20 mg) by mouth daily    ONDANSETRON (ZOFRAN ODT) 4 MG ODT TAB    Take 1 tablet (4 mg) by mouth every 8 hours as needed for nausea    SUCRALFATE (CARAFATE) 1 GM TABLET    Take 1 tablet (1 g) by mouth 4 times daily for 5 days       Scribe Disclosure:  Gunner HALE, am serving as a scribe at 12:20 AM on 8/17/2021 to document services personally performed by Ashwini Polo DO based on my observations and the provider's statements to me.            Ashwini Polo DO  08/17/21 0141

## 2021-08-19 ENCOUNTER — TELEPHONE (OUTPATIENT)
Dept: PALLIATIVE MEDICINE | Facility: CLINIC | Age: 21
End: 2021-08-19

## 2021-08-19 ENCOUNTER — OFFICE VISIT (OUTPATIENT)
Dept: FAMILY MEDICINE | Facility: CLINIC | Age: 21
End: 2021-08-19
Payer: COMMERCIAL

## 2021-08-19 VITALS
WEIGHT: 156 LBS | DIASTOLIC BLOOD PRESSURE: 73 MMHG | TEMPERATURE: 97.9 F | SYSTOLIC BLOOD PRESSURE: 111 MMHG | HEART RATE: 74 BPM | OXYGEN SATURATION: 100 % | BODY MASS INDEX: 27.47 KG/M2

## 2021-08-19 DIAGNOSIS — G89.4 CHRONIC PAIN SYNDROME: ICD-10-CM

## 2021-08-19 DIAGNOSIS — R10.13 EPIGASTRIC PAIN: Primary | ICD-10-CM

## 2021-08-19 PROCEDURE — 99213 OFFICE O/P EST LOW 20 MIN: CPT | Performed by: PHYSICIAN ASSISTANT

## 2021-08-19 RX ORDER — SUCRALFATE 1 G/1
1 TABLET ORAL 4 TIMES DAILY
Qty: 90 TABLET | Refills: 0 | Status: SHIPPED | OUTPATIENT
Start: 2021-08-19

## 2021-08-19 RX ORDER — FAMOTIDINE 20 MG/1
20 TABLET, FILM COATED ORAL DAILY
Qty: 90 TABLET | Refills: 0 | Status: SHIPPED | OUTPATIENT
Start: 2021-08-19 | End: 2021-11-22

## 2021-08-19 NOTE — TELEPHONE ENCOUNTER
Order received for a New Evaluation.    Are there any red flags that may impact the assessment or management of the patient?   Mental Illness / Communication Difficulties - use comments. previous suicide attempt        Routing to review.      Keerthi MAYES    [facility]

## 2021-08-19 NOTE — PROGRESS NOTES
Assessment & Plan     Epigastric pain    Likely GERD. Will test for H pylori. Continue pepcid. Start to wean off carafate and take as needed.    - Helicobacter pylori Breath Test Adult; Future  - famotidine (PEPCID) 20 MG tablet; Take 1 tablet (20 mg) by mouth daily  - sucralfate (CARAFATE) 1 GM tablet; Take 1 tablet (1 g) by mouth 4 times daily  - Helicobacter pylori Antigen Stool; Future  - Helicobacter pylori Antigen Stool      Chronic pain syndrome    Patient requested referral to chronic pain specialist.    - Pain Management Referral; Future  - Helicobacter pylori Antigen Stool; Future  - Helicobacter pylori Antigen Stool                   No follow-ups on file.    Willie Bryant PA-C  [facility] JAMEL Herring is a 21 year old who presents for the following health issues     HPI     ED/UC Followup:    Facility:  [facility]  Date of visit: 08/17/21  Reason for visit: abdominal pain   Current Status: pt states its about the same        Abdominal/Flank Pain  Onset/Duration: one month   Description:   Character: Sharp and Cramping  Location: upper abdominal region   Radiation: None  Intensity: mild  Progression of Symptoms:  same  Accompanying Signs & Symptoms:  Fever/Chills: no  Gas/Bloating: YES  Nausea: YES  Vomitting: no  Diarrhea: YES  Constipation: no  Dysuria or Hematuria: no  History:   Trauma: no  Previous similar pain: no  Previous tests done: none  Precipitating factors:   Does the pain change with:     Food: YES, pain is worse- doesn't radiate into the back     Bowel Movement: YES- sometimes has relief     Urination: no   Other factors:  no  Therapies tried and outcome: famotidine, ondansetron, sucralfate, pepto bismol- helping some  No LMP recorded (lmp unknown). (Menstrual status: IUD).      Review of Systems   Constitutional, HEENT, cardiovascular, pulmonary, gi and gu systems are negative, except as otherwise noted.         Objective    /73 (BP Location: Right arm, Patient Position: Chair, Cuff Size: Adult Regular)   Pulse 74   Temp 97.9  F (36.6  C) (Oral)   Wt 70.8 kg (156 lb)   LMP  (LMP Unknown)   SpO2 100%   BMI 27.47 kg/m    Body mass index is 27.47 kg/m .       Physical Exam     GENERAL: healthy, alert and no distress  EYES: Eyes grossly normal to inspection, PERRL and conjunctivae and sclerae normal  RESP: lungs clear to auscultation - no rales, rhonchi or wheezes  CV: regular rate and rhythm, normal S1 S2, no S3 or S4, no murmur, click or rub, no peripheral edema and peripheral pulses strong  ABDOMEN: Mildly tender to palpation in epigastric area and RUQ. No guarding or rebound tenderness. Otherwise soft, nontender, no hepatosplenomegaly, no masses and bowel sounds normal  MS: no gross musculoskeletal defects noted, no edema  SKIN: no suspicious lesions or rashes  NEURO: Normal strength and tone, mentation intact and speech normal  PSYCH: mentation appears normal, affect normal/bright        \plain

## 2021-08-20 ENCOUNTER — APPOINTMENT (OUTPATIENT)
Dept: LAB | Facility: CLINIC | Age: 21
End: 2021-08-20
Payer: COMMERCIAL

## 2021-08-20 PROCEDURE — 87338 HPYLORI STOOL AG IA: CPT | Performed by: PHYSICIAN ASSISTANT

## 2021-08-20 NOTE — TELEPHONE ENCOUNTER
Ok to schedule. No recent concerns noted.    Erica Flores MD  [facility]

## 2021-08-23 LAB — H PYLORI AG STL QL IA: NEGATIVE

## 2021-08-23 NOTE — TELEPHONE ENCOUNTER

## 2021-09-01 ENCOUNTER — OFFICE VISIT (OUTPATIENT)
Dept: PALLIATIVE MEDICINE | Facility: CLINIC | Age: 21
End: 2021-09-01
Attending: PHYSICIAN ASSISTANT
Payer: COMMERCIAL

## 2021-09-01 VITALS — HEART RATE: 62 BPM | OXYGEN SATURATION: 96 % | DIASTOLIC BLOOD PRESSURE: 73 MMHG | SYSTOLIC BLOOD PRESSURE: 107 MMHG

## 2021-09-01 DIAGNOSIS — G89.29 CHRONIC MYOFASCIAL PAIN: Primary | ICD-10-CM

## 2021-09-01 DIAGNOSIS — G89.4 CHRONIC PAIN SYNDROME: ICD-10-CM

## 2021-09-01 DIAGNOSIS — M79.605 PAIN OF LEFT LOWER EXTREMITY: ICD-10-CM

## 2021-09-01 DIAGNOSIS — M79.18 CHRONIC MYOFASCIAL PAIN: Primary | ICD-10-CM

## 2021-09-01 DIAGNOSIS — G89.29 CHRONIC BILATERAL LOW BACK PAIN WITH LEFT-SIDED SCIATICA: ICD-10-CM

## 2021-09-01 DIAGNOSIS — M54.42 CHRONIC BILATERAL LOW BACK PAIN WITH LEFT-SIDED SCIATICA: ICD-10-CM

## 2021-09-01 PROCEDURE — 99204 OFFICE O/P NEW MOD 45 MIN: CPT | Performed by: PHYSICAL MEDICINE & REHABILITATION

## 2021-09-01 RX ORDER — GABAPENTIN 100 MG/1
300 CAPSULE ORAL 3 TIMES DAILY
Qty: 270 CAPSULE | Refills: 0 | Status: SHIPPED | OUTPATIENT
Start: 2021-09-01 | End: 2021-11-02

## 2021-09-01 ASSESSMENT — PAIN SCALES - GENERAL: PAINLEVEL: MILD PAIN (2)
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
## 2021-09-01 NOTE — Clinical Note
Nevaeh was seen today for a chronic pain eval. They likely do meet criteria for fibromyalgia but I would like to repeat their rheumatological labs, will discuss with patient at follow-up. For now they will titrate gabapentin, try Yoga and adding aerobic exercise daily. They will follow-up in 2 months and if symptoms are persistent, we will do the inflammatory labs and have them work with chronic pain PT and pain psychology.    Thanks for this referral,    Shiraz Terry DO  [facility]

## 2021-09-01 NOTE — PATIENT INSTRUCTIONS
1. Try Yoga with eugenia on youtube. There are several videos on here, I think doing these in the mornings will provide a good stretch for your neck, back and arms and legs as well.    2. Start daily aerobic activity in the evenings, walking, biking, swimming etc.    3. Increase gabapentin as follows:   1.  Gabapentin 1 tab= 100mg   AM   PM   Bedtime   100mg (1 tab)      100mg (1 tab)       100mg (1 tab). If tolerating, after 2-3 days, increase as tolerated to next line   100mg (1 tabs)  100mg (2 tabs)  200mg (2 tabs). If tolerating, after 2-3 days, increase as tolerated to next line   200mg (2 tabs)  100mg (1 tabs)  200mg (2 tabs). If tolerating, after 2-3 days, increase as tolerated to next line   200mg (2 tabs)  200mg (2 tabs)  200mg (2 tabs). If tolerating, after 2-3 days, increase as tolerated to next line   200mg (2 tabs)  200mg (2 tabs)  300mg (3 tabs). If tolerating, after 2-3 days, increase as tolerated to next line   300mg (3 tabs)  200mg (2 tabs)  300mg (3 tabs). If tolerating, after 2-3 days, increase as tolerated to next line   300mg (3 tabs)  300mg (3 tabs)  300mg (3 tabs).   Call us when you are at this dose or with any concerns.   Avoid activities that require mental alertness or coordination until you realize the effects of gabapentin as it can cause dizziness, sleepiness, fatigue and incoordination.    Common side effects from gabapentin use includes: Dizziness, Sleepiness, Fatigue, Difficulty with coordination, Swelling and Nausea/vomiting.      4. Call the number below to set up a 2 month follow-up.    Sera borrero,    Shiraz Terry DO  [facility]            ----------------------------------------------------------------  Clinic Number:  [phone]     Call with any questions about your care and for scheduling assistance.     Calls are returned Monday through Friday between 8 AM and 4:30 PM. We usually get back to you within 2 business days depending on the  issue/request.    If we are prescribing your medications:    For opioid medication refills, call the clinic or send a Revelation message 7 days in advance.  Please include:    Name of requested medication    Name of the pharmacy.    For non-opioid medications, call your pharmacy directly to request a refill. Please allow 3-4 days to be processed.     Per [place] State Law:    All controlled substance prescriptions must be filled within 30 days of being written.      For those controlled substances allowing refills, pickup must occur within 30 days of last fill.      We believe regular attendance is key to your success in our program!      Any time you are unable to keep your appointment we ask that you call us at least 24 hours in advance to cancel.This will allow us to offer the appointment time to another patient.     Multiple missed appointments may lead to dismissal from the clinic.

## 2021-10-29 ENCOUNTER — HOSPITAL ENCOUNTER (EMERGENCY)
Facility: CLINIC | Age: 21
Discharge: HOME | End: 2021-10-30
Attending: INTERNAL MEDICINE | Admitting: INTERNAL MEDICINE
Payer: COMMERCIAL

## 2021-10-29 DIAGNOSIS — F16.120: ICD-10-CM

## 2021-10-29 DIAGNOSIS — F16.920: ICD-10-CM

## 2021-10-29 LAB
ANION GAP SERPL CALCULATED.3IONS-SCNC: 5 MMOL/L (ref 3–14)
APAP SERPL-MCNC: <2 MG/L (ref 10–30)
BUN SERPL-MCNC: 11 MG/DL (ref 7–30)
CALCIUM SERPL-MCNC: 9.8 MG/DL (ref 8.5–10.1)
CHLORIDE BLD-SCNC: 106 MMOL/L (ref 94–109)
CO2 SERPL-SCNC: 26 MMOL/L (ref 20–32)
CREAT SERPL-MCNC: 0.68 MG/DL (ref 0.52–1.04)
GFR SERPL CREATININE-BSD FRML MDRD: >90 ML/MIN/1.73M2
GLUCOSE BLD-MCNC: 101 MG/DL (ref 70–99)
INR PPP: 1.08 (ref 0.85–1.15)
POTASSIUM BLD-SCNC: 3.4 MMOL/L (ref 3.4–5.3)
SALICYLATES SERPL-MCNC: 3 MG/DL
SODIUM SERPL-SCNC: 137 MMOL/L (ref 133–144)

## 2021-10-29 PROCEDURE — 99283 EMERGENCY DEPT VISIT LOW MDM: CPT

## 2021-10-29 PROCEDURE — 80179 DRUG ASSAY SALICYLATE: CPT | Performed by: INTERNAL MEDICINE

## 2021-10-29 PROCEDURE — 80048 BASIC METABOLIC PNL TOTAL CA: CPT | Performed by: INTERNAL MEDICINE

## 2021-10-29 PROCEDURE — 36415 COLL VENOUS BLD VENIPUNCTURE: CPT | Performed by: INTERNAL MEDICINE

## 2021-10-29 PROCEDURE — 80143 DRUG ASSAY ACETAMINOPHEN: CPT | Performed by: INTERNAL MEDICINE

## 2021-10-29 PROCEDURE — 99283 EMERGENCY DEPT VISIT LOW MDM: CPT | Performed by: INTERNAL MEDICINE

## 2021-10-29 PROCEDURE — 85610 PROTHROMBIN TIME: CPT | Performed by: INTERNAL MEDICINE

## 2021-10-29 ASSESSMENT — MIFFLIN-ST. JEOR: SCORE: 1396.85

## 2021-10-30 VITALS
OXYGEN SATURATION: 97 % | RESPIRATION RATE: 18 BRPM | WEIGHT: 145 LBS | BODY MASS INDEX: 25.69 KG/M2 | SYSTOLIC BLOOD PRESSURE: 104 MMHG | DIASTOLIC BLOOD PRESSURE: 77 MMHG | HEIGHT: 63 IN | HEART RATE: 98 BPM | TEMPERATURE: 97.9 F

## 2021-10-30 NOTE — DISCHARGE INSTRUCTIONS
Please follow-up with your primary provider in the next several days.  If you have any concerns please return to the ER.
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
## 2021-10-30 NOTE — ED PROVIDER NOTES
ED Provider Note  [facility]      History     Chief Complaint   Patient presents with     Drug Overdose     HPI  Nevaeh Mccann is a 21 year old female with a past medical history significant for polysubstance abuse who presents here to the Emergency Department via EMS due to LSD intoxication.  She took 4 tabs which is significantly more than she usually does, became very anxious and was scared she was dying and called 911.  The rest of the history was taken after the patient was clinically sober I was able to participate in history taking.  She denies any suicidality and says she was taking the LSD for fun.  She denies any other recreational drugs or alcohol use today.    Past Medical History  Past Medical History:   Diagnosis Date     Anxiety      Depression      IUD (intrauterine device) in place 08/2017    Kyleena IUD     Polysubstance abuse (H)     started at age 15 years     PTSD (post-traumatic stress disorder)      Self-injurious behavior      Past Surgical History:   Procedure Laterality Date     NO HISTORY OF SURGERY       TONSILLECTOMY       famotidine (PEPCID) 20 MG tablet  gabapentin (NEURONTIN) 100 MG capsule  levonorgestrel (KYLEENA) 19.5 MG IUD  sucralfate (CARAFATE) 1 GM tablet  venlafaxine (EFFEXOR-ER) 225 MG TB24 24 hr tablet  Vitamin D, Cholecalciferol, 1000 units CAPS      Allergies   Allergen Reactions     Azithromycin Swelling     Penicillins      Tongue swelling     Sulfamethoxazole W/Trimethoprim      Family History  Family History   Problem Relation Age of Onset     Diabetes Father      Asthma Father      Arrhythmia Father         Atrial Fibrillation     Migraines Mother      Fibromyalgia Mother      Alcoholism Maternal Grandfather      Brain Cancer Maternal Grandfather      Suicide Maternal Uncle         Great Uncle - completed suicide via GSW     Social History   Social History     Tobacco Use     Smoking status: Current Some Day Smoker     Types: Other      "Smokeless tobacco: Never Used     Tobacco comment: vaping/E-cigs   Substance Use Topics     Alcohol use: Yes     Alcohol/week: 1.0 standard drinks     Types: 1 Glasses of wine per week     Comment: rarely     Drug use: No     Types: Marijuana     Comment: quit around 6/1/18; over the weekend, has tried LSD and Xanax.        Past medical history, past surgical history, medications, allergies, family history, and social history were reviewed with the patient. No additional pertinent items.       Review of Systems  A complete review of systems was performed with pertinent positives and negatives noted in the HPI, and all other systems negative.    Physical Exam   BP: 131/80  Pulse: 120  Temp: 97.9  F (36.6  C)  Resp: 18  Height: 160 cm (5' 3\")  Weight: 65.8 kg (145 lb)  SpO2: 95 %  Physical Exam  Vitals and nursing note reviewed.   Constitutional:       General: She is not in acute distress.     Appearance: She is not ill-appearing, toxic-appearing or diaphoretic.   HENT:      Head: Atraumatic.      Mouth/Throat:      Mouth: Mucous membranes are moist.   Eyes:      General: No scleral icterus.     Extraocular Movements: Extraocular movements intact.      Pupils: Pupils are equal, round, and reactive to light.   Cardiovascular:      Rate and Rhythm: Normal rate and regular rhythm.   Pulmonary:      Effort: Pulmonary effort is normal. No respiratory distress.      Breath sounds: Normal breath sounds. No wheezing or rales.   Chest:      Chest wall: No tenderness.   Abdominal:      General: There is no distension.      Palpations: Abdomen is soft.      Tenderness: There is no abdominal tenderness. There is no guarding.   Musculoskeletal:         General: No swelling, tenderness or signs of injury.      Cervical back: No rigidity or tenderness.   Skin:     General: Skin is warm and dry.   Neurological:      General: No focal deficit present.      Mental Status: She is alert and oriented to person, place, and time.      " Cranial Nerves: No cranial nerve deficit.      Sensory: No sensory deficit.      Motor: No weakness.      Coordination: Coordination normal.   Psychiatric:         Mood and Affect: Mood normal.         ED Course      Procedures              Results for orders placed or performed during the hospital encounter of 10/29/21   Basic metabolic panel     Status: Abnormal   Result Value Ref Range    Sodium 137 133 - 144 mmol/L    Potassium 3.4 3.4 - 5.3 mmol/L    Chloride 106 94 - 109 mmol/L    Carbon Dioxide (CO2) 26 20 - 32 mmol/L    Anion Gap 5 3 - 14 mmol/L    Urea Nitrogen 11 7 - 30 mg/dL    Creatinine 0.68 0.52 - 1.04 mg/dL    Calcium 9.8 8.5 - 10.1 mg/dL    Glucose 101 (H) 70 - 99 mg/dL    GFR Estimate >90 >60 mL/min/1.73m2   Acetaminophen level     Status: Abnormal   Result Value Ref Range    Acetaminophen <2 (L) 10 - 30 mg/L   Salicylate level     Status: Normal   Result Value Ref Range    Salicylate 3 <20 mg/dL   INR     Status: Normal   Result Value Ref Range    INR 1.08 0.85 - 1.15     Medications - No data to display     Assessments & Plan (with Medical Decision Making)   Well-appearing patient who came in with LSD intoxication and called 911 as of severe anxiety and feeling like she was dying.  He took 4 tabs which is a significantly increased dose from her usual.  On arrival she was agitated, anxious, paranoid but was able to self soothe when put in a dark room on an ER bed.  Throughout ED stay she was hemodynamically stable, nontoxic, afebrile.  She did not need any meds here, including benzodiazepines.  Tylenol level was undetectable.  Salicylate level was not elevated.  After she woke she was alert and interactive, oriented fully.  Her mood was somewhat embarrassed.  Her speech was with normal adam and tone, not pressured.  She denied any suicidal thoughts and was looking forward to going home with her father who is coming to pick her up.  No clinical toxidrome after resting in the ER several hours.   She agrees to plan of going home and following up with her primary physician.    I have reviewed the nursing notes. I have reviewed the findings, diagnosis, plan and need for follow up with the patient.    Discharge Medication List as of 10/30/2021  1:17 AM          Final diagnoses:   Hallucinogen intoxication, uncomplicated (H)       --  Kerri Saldivar MD  [facility] EMERGENCY DEPARTMENT  10/29/2021     Kerri Saldivar MD  10/30/21 [id number]

## 2021-10-30 NOTE — ED NOTES
Pt awake, cooperative and pleasant. Ambulated with tech to bathroom, gait strong and steady. Pt called father, who will come to ER to pick pt up and take her home to parent's home.

## 2021-10-30 NOTE — ED NOTES
Spoke with mother and father of pt. Explained pt status, labs, and need for p[t to be safely discharged with responsible party. Parents do not feel safe driving to ER at this time of night. They gave number of friend (Eric) [phone] who can possibly pick patient up and bring her to parents house. Pt is arousal, follows commands, has been sleeping in cart with 1:1 sitter

## 2021-11-02 DIAGNOSIS — G89.29 CHRONIC MYOFASCIAL PAIN: ICD-10-CM

## 2021-11-02 DIAGNOSIS — G89.4 CHRONIC PAIN SYNDROME: ICD-10-CM

## 2021-11-02 DIAGNOSIS — M79.18 CHRONIC MYOFASCIAL PAIN: ICD-10-CM

## 2021-11-02 RX ORDER — GABAPENTIN 300 MG/1
300 CAPSULE ORAL 3 TIMES DAILY
Qty: 90 CAPSULE | Refills: 1 | Status: SHIPPED | OUTPATIENT
Start: 2021-11-02 | End: 2021-12-14

## 2021-11-02 NOTE — TELEPHONE ENCOUNTER
Called pt to confirm dosing, is taking 300mg TID, tolerating. Pt cell is [phone].       Gabapentin 300mg, #90, Refill:1      Niecy BRADFORD, RN Care Coordinator  [facility]  Pain [facility]

## 2021-11-02 NOTE — TELEPHONE ENCOUNTER
Reason for call:  Medication   If this is a refill request, has the caller requested the refill from the pharmacy already? No  Will the patient be using a [facility]? No  Name of the pharmacy and phone number for the current request: [id number] - [place], [place] - [address] [address]    Name of the medication requested: gabapentin (NEURONTIN) 100 MG capsule    Other request: none    Phone number to reach patient:  Cell number on file:    Telephone Information:   Mobile [phone]       Best Time:  anytime    Can we leave a detailed message on this number?  YES    Travel screening: Not Applicable     Keerthi MAYES    [facility] Pain Management

## 2021-11-02 NOTE — TELEPHONE ENCOUNTER
Chief Complaint   Patient presents with     Refill Request     gabapentin (NEURONTIN) 100 MG capsule      Refill request received via phone by patient or caregiver        [id number] - [place], [place] - [address]       No prescriptions requested or ordered in this encounter         Last Written Prescription Date:  10/1/21  Last Fill Quantity: 270,   # refills: 0  Last Office Visit with prescribing provider: 9/1/21  Future Office visit:    Next 5 appointments (look out 90 days)    Nov 09, 2021  3:30 PM  Return Visit with Shiraz Terry MD  [facility] [place] ([facility] [place] ) [address] [address] [address]  [phone]

## 2021-11-08 NOTE — PROGRESS NOTES
[facility]    Date of visit: 2021      Assessment:  Nevaeh Mccann is a 21 year old with past medical history including: Depression, Anxiety, Suicide attempt, Substance abuse, PTSD who presents for evaluation and treatment of the followin. Chronic widespread myofascial pain: Kemal reports onset of chronic left leg pain as a child and over the past 2 years onset of constant low back pain with intermittent pain in the neck, shoulders, upper and lower extremities. They describe constant aches in the low back with intermittent sharp pains in the back, neck, arms and legs. They had a rheumatological workup in 2019 which showed a positive KAREN, all other labs were negative. On exam today they had myofascial tenderness in the shoulder girdle, hip girdle, neck and low back. Neurological exam was normal other than reduced sensation in the left medial lower leg. They likely do meet the criteria for fibromyalgia, may need repeat inflammatory testing to rule out any underlying rheumatological condition.      Mental Health - the patient's mental health concerns, specifically anxiety and depression, affect her experience of pain and contribute to her clinically significant distress.           Plan:  The following recommendations were given to the patient. Diagnosis, treatment options, risks, benefits, and alternatives were discussed, and all questions were answered. The patient expressed understanding of the plan for management.      I am recommending a multidisciplinary treatment plan to help this patient better manage her pain.  This includes:      1. Physical Therapy: Chronic pain PT referral placed, goal is to develop a HEP/stretching program to help with myofascial neck and back pain.  2. Clinical Health Pain Psychologist: Pain phd referral placed. I let the patient know they are booking about 3 months out. However this will give them time to engage with  primary mental bina services which they report they have an initial evaluation coming up.  1. Therapy can help reduce physical and psychosocial triggers or reinforcers of pain by adapting thoughts, feelings and behaviors to reduce symptoms and increase quality of life.  Evidence indicates that the practice of relaxation, meditation, and mindfulness techniques can significantly affect pain levels and overall well being.  3. Self Care Recommendations: Gentle progressive exercise that does not increase pain - gradually increase daily walking program.  Take mini breaks - 5 minutes of mindfullness a couple times a day.   1. Strongly advised patient to engage in aerobic exercises with goal of increasing to 30 minutes 5 days/week.  4. Diagnostic Studies: Consider lumbar MRI if low back pain is persistent.  5. Medication Management:   1. Increase gabapentin in 300mg increments every 3-4 days to 600mg TID as directed in AVS.  6. Further procedures recommended: none  7. Follow up: Patient recently moved to the [facility] from the [facility] and is request a provider at [place]. Will discuss with pain clinic supervisor.      Shiraz Terry DO  [facility] Management            Chief complaint:   Chief Complaint   Patient presents with     Pain       Interval history:  Nevaeh Mccann is a 21 year old female last seen by me on 9/1/21.        Since her last visit, Nevaeh Mccann reports:  -Patient arrived late, let them know we only had 10 minutes for our visit. They had to drive down from [place] and were ok with this. They are wondering if there are providers closer to the [facility] they can see.    -Moved to the [facility] area recently and this was stressful. Had to move in the middle of the school year. Classes are going well    -They went to the ED with LSD intoxication. They report being given more than they anticipated. Had feeling of impending death/doom. Experience was traumatizing. They have  an intake with psychiatry coming up.    -they deny any other substance abuse.     -They deny any thoughts of self harm or harming others at this time.    -Gabapentin has been helpful but they continue to have neck, low back and leg pain. No side effects noted with gabapentin.    Pain scores:  Pain intensity on average is 6 on a scale of 0-10.        Pain Treatments:  1. Medications:       Current pain medications:  -Gabapentin  -Tylenol 650mg once or twice daily as needed  -Ibuprofen 400mg once daily as needed       Previous pain medications:  -Venlafaxine - tapered off  2. Physical Therapy: nh                TENS unit: hasn't tried  3. Pain psychology: hasn't tried  4. Surgery: none  5. Injections: none  6. Alternative Therapies:               Chiropractic: helpful, started 2 weeks ago               Acupuncture: hasn't tried            Side Effects: no side effect    Medications:  Current Outpatient Medications   Medication Sig Dispense Refill     acetaminophen (TYLENOL) 500 MG tablet Take 500-1,000 mg by mouth every 6 hours as needed for mild pain       gabapentin (NEURONTIN) 300 MG capsule Take 1 capsule (300 mg) by mouth 3 times daily 90 capsule 1     ibuprofen (ADVIL/MOTRIN) 200 MG capsule Take 200 mg by mouth every 4 hours as needed for fever       levonorgestrel (KYLEENA) 19.5 MG IUD 1 each by Intrauterine route       Vitamin D, Cholecalciferol, 1000 units CAPS Take 1,000 Units by mouth daily        famotidine (PEPCID) 20 MG tablet Take 1 tablet (20 mg) by mouth daily (Patient not taking: Reported on 11/9/2021) 90 tablet 0     sucralfate (CARAFATE) 1 GM tablet Take 1 tablet (1 g) by mouth 4 times daily (Patient not taking: Reported on 11/9/2021) 90 tablet 0     venlafaxine (EFFEXOR-ER) 225 MG TB24 24 hr tablet Take 1 tablet (225 mg) by mouth daily (with breakfast) (Patient not taking: Reported on 11/9/2021) 30 each 0       Medical History: any changes in medical history since they were last seen? No    Review  of Systems:  Positive for: back pain, neck pain, leg pain  Any bowel or bladder problems: denies  Mood: anxiety    Physical Exam:  Blood pressure 124/85, pulse 68, SpO2 98 %, not currently breastfeeding.  Deferred for conversation    BILLING TIME DOCUMENTATION:   The total TIME spent on this patient on the date of the encounter/appointment was 25 minutes.      TOTAL TIME includes:   Time spent preparing to see the patient (reviewing records and tests)   Time spent face to face (or over the phone) with the patient   Time spent ordering tests, medications, procedures and referrals   Time spent Referring and communicating with other healthcare professionals   Time spent documenting clinical information in Epic        DO [facility]

## 2021-11-09 ENCOUNTER — OFFICE VISIT (OUTPATIENT)
Dept: PALLIATIVE MEDICINE | Facility: CLINIC | Age: 21
End: 2021-11-09
Payer: COMMERCIAL

## 2021-11-09 VITALS — HEART RATE: 68 BPM | SYSTOLIC BLOOD PRESSURE: 124 MMHG | OXYGEN SATURATION: 98 % | DIASTOLIC BLOOD PRESSURE: 85 MMHG

## 2021-11-09 DIAGNOSIS — G89.4 CHRONIC PAIN SYNDROME: ICD-10-CM

## 2021-11-09 DIAGNOSIS — M79.18 CHRONIC MYOFASCIAL PAIN: Primary | ICD-10-CM

## 2021-11-09 DIAGNOSIS — G89.29 CHRONIC MYOFASCIAL PAIN: Primary | ICD-10-CM

## 2021-11-09 PROCEDURE — 99213 OFFICE O/P EST LOW 20 MIN: CPT | Performed by: PHYSICAL MEDICINE & REHABILITATION

## 2021-11-09 RX ORDER — OMEGA-3 FATTY ACIDS/FISH OIL 300-1000MG
200 CAPSULE ORAL EVERY 4 HOURS PRN
COMMUNITY

## 2021-11-09 RX ORDER — ACETAMINOPHEN 500 MG
500-1000 TABLET ORAL EVERY 6 HOURS PRN
COMMUNITY

## 2021-11-09 ASSESSMENT — PAIN SCALES - GENERAL: PAINLEVEL: SEVERE PAIN (6)

## 2021-11-09 NOTE — PATIENT INSTRUCTIONS
1. I ordered a pain physical therapy referral, you'll be called to schedule.    2. Increase gabapentin as follows:  Increase gabapentin as follows:  1 tab= 300mg    AM   PM   Bedtime  300   300   600mg (2 tab).  After 3-4 days, increase as tolerated to the next line  600mg (2 tab)  300   600 (2 tab).  After 3-4 days, increase as tolerated to the next line  600mg (2 tab)  600mg (2 tab)  600 (2 tab).    Call with any problems or when you are at this dose. We can prescribe 600mg tabs going forwards.       Avoid activities that require mental alertness or coordination until you realize the effects of gabapentin as it can cause dizziness, sleepiness, fatigue and incoordination.    3. I will talk to my clinic supervisor about having you transferred to a provider at the [facility].    Take care,    Shiraz Terry, [facility]        ----------------------------------------------------------------  Clinic Number:  [phone]     Call with any questions about your care and for scheduling assistance.     Calls are returned Monday through Friday between 8 AM and 4:30 PM. We usually get back to you within 2 business days depending on the issue/request.    If we are prescribing your medications:    For opioid medication refills, call the clinic or send a Anne Fogarty message 7 days in advance.  Please include:    Name of requested medication    Name of the pharmacy.    For non-opioid medications, call your pharmacy directly to request a refill. Please allow 3-4 days to be processed.     Per [place] State Law:    All controlled substance prescriptions must be filled within 30 days of being written.      For those controlled substances allowing refills, pickup must occur within 30 days of last fill.      We believe regular attendance is key to your success in our program!      Any time you are unable to keep your appointment we ask that you call us at least 24 hours in advance to cancel.This will allow  us to offer the appointment time to another patient.     Multiple missed appointments may lead to dismissal from the clinic.

## 2021-11-13 ENCOUNTER — HEALTH MAINTENANCE LETTER (OUTPATIENT)
Age: 21
End: 2021-11-13

## 2021-12-13 NOTE — PROGRESS NOTES
[facility]    Date of visit: 12/13/2021    Chief complaint: No chief complaint on file.      Interval history:  Nevaeh Mccann was last seen by me on ***.      Recommendations/plan at the last visit included:  1. Physical Therapy: Chronic pain PT referral placed, goal is to develop a HEP/stretching program to help with myofascial neck and back pain.  2. Clinical Health Pain Psychologist: Pain phd referral placed. I let the patient know they are booking about 3 months out. However this will give them time to engage with primary mental bina services which they report they have an initial evaluation coming up.  1. Therapy can help reduce physical and psychosocial triggers or reinforcers of pain by adapting thoughts, feelings and behaviors to reduce symptoms and increase quality of life.  Evidence indicates that the practice of relaxation, meditation, and mindfulness techniques can significantly affect pain levels and overall well being.  3. Self Care Recommendations: Gentle progressive exercise that does not increase pain - gradually increase daily walking program.  Take mini breaks - 5 minutes of mindfullness a couple times a day.   1. Strongly advised patient to engage in aerobic exercises with goal of increasing to 30 minutes 5 days/week.  4. Diagnostic Studies: Consider lumbar MRI if low back pain is persistent.  5. Medication Management:   1. Increase gabapentin in 300mg increments every 3-4 days to 600mg TID as directed in AVS.  6. Further procedures recommended: none  7. Follow up: Patient recently moved to the [facility] from the [facility] and is request a provider at [place]. Will discuss with pain clinic supervisor.      Since her last visit, Nevaeh Mccann reports:  -her pain is diffuse, which feels like an achiness.  She feels a deep pain in the upper back, between shoulder blades, and neck.  The pain in the low back sharp, on the left side  "more.  Down the back of the left leg to the foot.  She will have addi intermittent numbness.  On the right side she has intermittent numbness in the great toe, and on the left side intermittent in the back and inner thigh.      Pain scores:  Pain intensity on average is {NUMBERS 0-10:680494} on a scale of 0-10.     Current pain treatments:   Gabapentin 600mg TID.  May help somewhat.  Not sure if causing anxiety    Past pain treatments:  ***    Side Effects: {SIDE EFFECTS:940410}    Medications:  Current Outpatient Medications   Medication Sig Dispense Refill     acetaminophen (TYLENOL) 500 MG tablet Take 500-1,000 mg by mouth every 6 hours as needed for mild pain       famotidine (PEPCID) 20 MG tablet TAKE 1 TABLET BY MOUTH EVERY DAY 90 tablet 1     gabapentin (NEURONTIN) 300 MG capsule Take 1 capsule (300 mg) by mouth 3 times daily 90 capsule 1     gabapentin (NEURONTIN) 600 MG tablet Take 1 tablet (600 mg) by mouth 3 times daily 90 tablet 0     ibuprofen (ADVIL/MOTRIN) 200 MG capsule Take 200 mg by mouth every 4 hours as needed for fever       levonorgestrel (KYLEENA) 19.5 MG IUD 1 each by Intrauterine route       sucralfate (CARAFATE) 1 GM tablet Take 1 tablet (1 g) by mouth 4 times daily (Patient not taking: Reported on 11/9/2021) 90 tablet 0     venlafaxine (EFFEXOR-ER) 225 MG TB24 24 hr tablet Take 1 tablet (225 mg) by mouth daily (with breakfast) (Patient not taking: Reported on 11/9/2021) 30 each 0     Vitamin D, Cholecalciferol, 1000 units CAPS Take 1,000 Units by mouth daily          Medical History: any changes in medical history since they were last seen? { :686287::\"No\"}    Lives alone- wrske in a group home.  Physical Exam:  not currently breastfeeding.  General: ***  Gait: {GAIT:700150}  MSK exam: ***    Assessment:      1. Chronic widespread myofascial pain: Kemal reports onset of chronic left leg pain as a child and over the past 2 years onset of constant low back pain with intermittent pain in the " neck, shoulders, upper and lower extremities. They describe constant aches in the low back with intermittent sharp pains in the back, neck, arms and legs. They had a rheumatological workup in 2019 which showed a positive KAREN, all other labs were negative. On exam today they had myofascial tenderness in the shoulder girdle, hip girdle, neck and low back. Neurological exam was normal other than reduced sensation in the left medial lower leg. They likely do meet the criteria for fibromyalgia, may need repeat inflammatory testing to rule out any underlying rheumatological condition.      Mental Health - the patient's mental health concerns, specifically anxiety and depression, affect her experience of pain and contribute to her clinically significant distress.    Nevaeh Mccann is a 21 year old female who is seen at the pain clinic for ***.    Plan:  1. Physical Therapy:  ***  2. Clinical Health Psychologist to address issues of relaxation, behavioral change, coping style, and other factors important to improvement.  ***  3. Diagnostic Studies:  ***  4. Medication Management:  ***  5. Further procedures recommended: ***  6. Recommendations to PCP: ***  7. Follow up: ***    *** minutes spent on the date of encounter doing chart review, history, and exam documentation and further activities as noted above.       Erica Flores MD

## 2021-12-14 ENCOUNTER — OFFICE VISIT (OUTPATIENT)
Dept: PALLIATIVE MEDICINE | Facility: CLINIC | Age: 21
End: 2021-12-14
Payer: COMMERCIAL

## 2021-12-14 ENCOUNTER — MYC MEDICAL ADVICE (OUTPATIENT)
Dept: PALLIATIVE MEDICINE | Facility: CLINIC | Age: 21
End: 2021-12-14

## 2021-12-14 VITALS — OXYGEN SATURATION: 97 % | DIASTOLIC BLOOD PRESSURE: 73 MMHG | HEART RATE: 85 BPM | SYSTOLIC BLOOD PRESSURE: 109 MMHG

## 2021-12-14 DIAGNOSIS — M79.18 CHRONIC MYOFASCIAL PAIN: ICD-10-CM

## 2021-12-14 DIAGNOSIS — M54.16 LUMBAR RADICULOPATHY: Primary | ICD-10-CM

## 2021-12-14 DIAGNOSIS — G89.29 CHRONIC MYOFASCIAL PAIN: ICD-10-CM

## 2021-12-14 DIAGNOSIS — G89.4 CHRONIC PAIN SYNDROME: ICD-10-CM

## 2021-12-14 DIAGNOSIS — M53.3 SI (SACROILIAC) JOINT DYSFUNCTION: ICD-10-CM

## 2021-12-14 RX ORDER — GABAPENTIN 600 MG/1
900 TABLET ORAL 3 TIMES DAILY
Qty: 135 TABLET | Refills: 0 | Status: SHIPPED | OUTPATIENT
Start: 2021-12-14 | End: 2022-01-21

## 2021-12-14 ASSESSMENT — PAIN SCALES - GENERAL: PAINLEVEL: MODERATE PAIN (4)

## 2021-12-14 NOTE — PATIENT INSTRUCTIONS
1. Call: [phone] to get a primary care provider    2. Schedule in January for follow up     3. Schedule MRI of the lumbar spine  [facility] Scheduling:  [facility] (including [place]): [phone]    4. Increase the gabapentin to 900mg (1.5 of the 600mg tabs) 3 times daily.  Start by doing this with the nighttime dose.      ----------------------------------------------------------------  Clinic Number:  [phone]     Call with any questions about your care and for scheduling assistance.     Calls are returned Monday through Friday between 8 AM and 4:30 PM. We usually get back to you within 2 business days depending on the issue/request.    If we are prescribing your medications:    For opioid medication refills, call the clinic or send a [phone] message 7 days in advance.  Please include:    Name of requested medication    Name of the pharmacy.    For non-opioid medications, call your pharmacy directly to request a refill. Please allow 3-4 days to be processed.     Per [place] State Law:    All controlled substance prescriptions must be filled within 30 days of being written.      For those controlled substances allowing refills, pickup must occur within 30 days of last fill.      We believe regular attendance is key to your success in our program!      Any time you are unable to keep your appointment we ask that you call us at least 24 hours in advance to cancel.This will allow us to offer the appointment time to another patient.     Multiple missed appointments may lead to dismissal from the clinic.

## 2021-12-27 ENCOUNTER — HOSPITAL ENCOUNTER (OUTPATIENT)
Dept: MRI IMAGING | Facility: CLINIC | Age: 21
Discharge: HOME | End: 2021-12-27
Attending: PSYCHIATRY & NEUROLOGY | Admitting: PSYCHIATRY & NEUROLOGY
Payer: COMMERCIAL

## 2021-12-27 DIAGNOSIS — M54.16 LUMBAR RADICULOPATHY: ICD-10-CM

## 2021-12-27 PROCEDURE — 72148 MRI LUMBAR SPINE W/O DYE: CPT | Mod: 26 | Performed by: RADIOLOGY

## 2021-12-27 PROCEDURE — 72148 MRI LUMBAR SPINE W/O DYE: CPT

## 2022-01-21 ENCOUNTER — OFFICE VISIT (OUTPATIENT)
Dept: PALLIATIVE MEDICINE | Facility: CLINIC | Age: 22
End: 2022-01-21
Payer: COMMERCIAL

## 2022-01-21 VITALS — SYSTOLIC BLOOD PRESSURE: 116 MMHG | HEART RATE: 56 BPM | DIASTOLIC BLOOD PRESSURE: 78 MMHG

## 2022-01-21 DIAGNOSIS — G89.29 CHRONIC MYOFASCIAL PAIN: ICD-10-CM

## 2022-01-21 DIAGNOSIS — M79.18 CHRONIC MYOFASCIAL PAIN: ICD-10-CM

## 2022-01-21 DIAGNOSIS — G89.4 CHRONIC PAIN SYNDROME: ICD-10-CM

## 2022-01-21 RX ORDER — GABAPENTIN 600 MG/1
900 TABLET ORAL 3 TIMES DAILY
Qty: 135 TABLET | Refills: 6 | Status: SHIPPED | OUTPATIENT
Start: 2022-01-21 | End: 2022-05-20

## 2022-01-21 ASSESSMENT — PAIN SCALES - GENERAL: PAINLEVEL: MILD PAIN (3)

## 2022-01-21 NOTE — PATIENT INSTRUCTIONS
1. For now, you can continue gabapentin 900mg TID   If you want to increase the future let me know    2. Look up piriformis exercises to do  Do them about 3 times/day with 5-10 reps.    3. Schedule PT  To schedule pain PT, please call Rehab Scheduling at [phone]. Remind them you are scheduling for Pain PT.       4. Schedule to follow up after 2-3 sessions

## 2022-01-21 NOTE — PROGRESS NOTES
pain                          [facility]    Date of visit: 12/13/2021    Chief complaint: No chief complaint on file.      Interval history:  Nevaeh Mccann was last seen by me on ***.      Recommendations/plan at the last visit included:  1. Physical Therapy: Chronic pain PT referral placed, goal is to develop a HEP/stretching program to help with myofascial neck and back pain.  2. Clinical Health Pain Psychologist: Pain phd referral placed. I let the patient know they are booking about 3 months out. However this will give them time to engage with primary mental bina services which they report they have an initial evaluation coming up.  1. Therapy can help reduce physical and psychosocial triggers or reinforcers of pain by adapting thoughts, feelings and behaviors to reduce symptoms and increase quality of life.  Evidence indicates that the practice of relaxation, meditation, and mindfulness techniques can significantly affect pain levels and overall well being.  3. Self Care Recommendations: Gentle progressive exercise that does not increase pain - gradually increase daily walking program.  Take mini breaks - 5 minutes of mindfullness a couple times a day.   1. Strongly advised patient to engage in aerobic exercises with goal of increasing to 30 minutes 5 days/week.  4. Diagnostic Studies: Consider lumbar MRI if low back pain is persistent.  5. Medication Management:   1. Increase gabapentin in 300mg increments every 3-4 days to 600mg TID as directed in AVS.  6. Further procedures recommended: none  7. Follow up: Patient recently moved to the [facility] from the [facility] and is request a provider at [place]. Will discuss with pain clinic supervisor.      Since her last visit, Nevaeh Mccann reports:  -she had COVID 1/2, and does feel more fatigue  -prior to getting sick, she was trying to get back to the gym, but then COVID  She is doing some yoga.  Left foot- feels some  "numbness on the bottom of the foot  She thinks it is somewhat tender- hardware floors.    -her pain is diffuse, which feels like an achiness.  She feels a deep pain in the upper back, between shoulder blades, and neck.  The pain in the low back sharp, on the left side more.  Down the back of the left leg to the foot.  She will have addi intermittent numbness.  On the right side she has intermittent numbness in the great toe, and on the left side intermittent in the back and inner thigh.      Pain scores:  Pain intensity on average is {NUMBERS 0-10:707087} on a scale of 0-10.     Current pain treatments:   Gabapentin 600mg TID.  May help somewhat.  Not sure if causing anxiety    Past pain treatments:  ***    Side Effects: {SIDE EFFECTS:334237}    Medications:  Current Outpatient Medications   Medication Sig Dispense Refill     acetaminophen (TYLENOL) 500 MG tablet Take 500-1,000 mg by mouth every 6 hours as needed for mild pain       famotidine (PEPCID) 20 MG tablet TAKE 1 TABLET BY MOUTH EVERY DAY (Patient not taking: Reported on 12/14/2021) 90 tablet 1     gabapentin (NEURONTIN) 600 MG tablet Take 1.5 tablets (900 mg) by mouth 3 times daily 135 tablet 0     ibuprofen (ADVIL/MOTRIN) 200 MG capsule Take 200 mg by mouth every 4 hours as needed for fever       levonorgestrel (KYLEENA) 19.5 MG IUD 1 each by Intrauterine route       sucralfate (CARAFATE) 1 GM tablet Take 1 tablet (1 g) by mouth 4 times daily (Patient not taking: Reported on 11/9/2021) 90 tablet 0     Vitamin D, Cholecalciferol, 1000 units CAPS Take 1,000 Units by mouth daily          Medical History: any changes in medical history since they were last seen? { :390821::\"No\"}    Lives alone- wrske in a group home.  Physical Exam:  not currently breastfeeding.  General: ***  Gait: {GAIT:659490}  MSK exam: ***    Assessment:      1. Chronic widespread myofascial pain: Kemal reports onset of chronic left leg pain as a child and over the past 2 years onset of " constant low back pain with intermittent pain in the neck, shoulders, upper and lower extremities. They describe constant aches in the low back with intermittent sharp pains in the back, neck, arms and legs. They had a rheumatological workup in 2019 which showed a positive KAREN, all other labs were negative. On exam today they had myofascial tenderness in the shoulder girdle, hip girdle, neck and low back. Neurological exam was normal other than reduced sensation in the left medial lower leg. They likely do meet the criteria for fibromyalgia, may need repeat inflammatory testing to rule out any underlying rheumatological condition.      Mental Health - the patient's mental health concerns, specifically anxiety and depression, affect her experience of pain and contribute to her clinically significant distress.    Nevaeh Mccann is a 21 year old female who is seen at the pain clinic for ***.    Plan:  1. Physical Therapy:  ***  2. Clinical Health Psychologist to address issues of relaxation, behavioral change, coping style, and other factors important to improvement.  ***  3. Diagnostic Studies:  ***  4. Medication Management:  ***  5. Further procedures recommended: ***  6. Recommendations to PCP: ***  7. Follow up: ***    *** minutes spent on the date of encounter doing chart review, history, and exam documentation and further activities as noted above.       Erica Flores MD

## 2022-01-25 ENCOUNTER — MYC MEDICAL ADVICE (OUTPATIENT)
Dept: PALLIATIVE MEDICINE | Facility: CLINIC | Age: 22
End: 2022-01-25
Payer: COMMERCIAL

## 2022-01-25 NOTE — LETTER
2022       To Whom It May Concern,    I am writing this message on behalf of Nevaehyulia Mccann,  2000.  This letter is to confim that Nevaeh is under my care for chronic pain condition, including diffuse myofascial pain syndrome.    I ask that appropriate accommodations be granted to her at school to allow her to continue with her classes/learning.      Erica Flores MD  [facility]

## 2022-01-26 NOTE — TELEPHONE ENCOUNTER
From: Nevaeh Mccann      Created: 1/25/2022 4:37 PM        Lukas Flores,     I m currently attempting to get accommodations from my university. On the days when the pain is really bad it hurts to walk and some of my classes are a 20+ walk away. Anyways, to get these accommodations I do need some kind of note stating I do have a chronic pain condition (maybe not specifically that, but proof that I am seeing a doctor for such things?). I also would like this note so that I can use it for work in regards to weight limitations. If you could send me one, that would be great!      Thanks,     Nevaeh         Will route request for letter to Dr Flores to review.     STEPHON PintoN, RN  Care Coordinator  [facility] [place]

## 2022-01-27 NOTE — TELEPHONE ENCOUNTER
1) I can write that letter.  How specific do you want me to be about your medical conditions?     2) can you provide more feedback on what you are looking for in regards to your job. We haven't had dsicussions about restrictions, and often with pain it is more based on what you feel you can do.  What is your job, and what are they wanting you to do?  What kind of restrictions are you hoping for?     Erica Flores MD  [facility]

## 2022-02-01 NOTE — TELEPHONE ENCOUNTER
From: Nevaeh Mccann      Created: 1/31/2022 4:32 PM        I m comfortable with you being as specific as possible. Also, I actually just put in my two weeks at my current job. If I end up needing a letter for my new job I ll let you know, but I am looking for a less physical job.      Thank you for writing this letter for me!           Patient is looking for a letter to her university       From: Kerri Benítez, RN      Created: 2/1/2022 9:55 AM        Sonu Herring! So the letter at this point is for accommodations from your university?      What specific accommodations are you seeking? If you prefer you can call me. [phone].     Thank you!  SUZETTE Pinto, RN  Care Coordinator  [facility] [place]

## 2022-02-03 NOTE — TELEPHONE ENCOUNTER
From: Nevaeh Mccann      Created: 2/1/2022 1:04 PM        Sonu Vegas,     Yes it is now just for my accommodations at the [employer]. I met with a person in the disability resource center today and I got accommodations such as attendance flexibility. I believe that they said they just need documentation  that I m getting treated by your pain clinic and that I m dealing with a chronic condition.      If you have anymore questions feel free to call me as well!      Nevaeh

## 2022-02-07 NOTE — TELEPHONE ENCOUNTER
Signed and given to ROSS key Lee.  Will need to understand where she would like signed version sent. See Mychart.    MD VICKY [facility]     --------------    Nevaeh,  Would you like us to email scan in the signed version or mail it to you?  The non-signed version is available in your letters section of Mychart.     MD VICKY [facility]

## 2022-02-07 NOTE — TELEPHONE ENCOUNTER
Pt returning call to state the letter can be sent to [id number]   [place] [address].    Keerthi MAYES    [facility]

## 2022-05-20 ENCOUNTER — TELEPHONE (OUTPATIENT)
Dept: PALLIATIVE MEDICINE | Facility: CLINIC | Age: 22
End: 2022-05-20
Payer: COMMERCIAL

## 2022-05-20 DIAGNOSIS — G89.4 CHRONIC PAIN SYNDROME: ICD-10-CM

## 2022-05-20 DIAGNOSIS — M79.18 CHRONIC MYOFASCIAL PAIN: ICD-10-CM

## 2022-05-20 DIAGNOSIS — G89.29 CHRONIC MYOFASCIAL PAIN: ICD-10-CM

## 2022-05-20 RX ORDER — GABAPENTIN 600 MG/1
900 TABLET ORAL 3 TIMES DAILY
Qty: 135 TABLET | Refills: 1 | Status: SHIPPED | OUTPATIENT
Start: 2022-05-20

## 2022-05-20 NOTE — TELEPHONE ENCOUNTER
Reason for call:  Medication   If this is a refill request, has the caller requested the refill from the pharmacy already? No  Will the patient be using a [facility]? Yes  Name of the pharmacy and phone number for the current request: [facility] [place], [place] - [address]    Name of the medication requested: gabapentin (NEURONTIN) 600 MG tablet    Other request: Pt will be out tomorrow    Phone number to reach patient:  Cell number on file:    Telephone Information:   Mobile [phone]       Best Time:  anytime    Can we leave a detailed message on this number?  YES    Travel screening: Not Applicable     Keerthi MAYES    [facility]

## 2022-05-20 NOTE — TELEPHONE ENCOUNTER
Signed Prescriptions:                        Disp   Refills    gabapentin (NEURONTIN) 600 MG tablet       135 ta*1        Sig: Take 1.5 tablets (900 mg) by mouth 3 times daily  Authorizing Provider: SOHAN MANNING, RN CNP, FNP  [facility]

## 2022-05-20 NOTE — TELEPHONE ENCOUNTER
Patient calling to schedule with a new provider. Patient is aware of provider status.    Routing to review care plan.    Keerthi MAYES    [facility]

## 2022-05-20 NOTE — TELEPHONE ENCOUNTER
Received fax request from [facility] [place], [place] - [address] pharmacy requesting refill(s) for gabapentin (NEURONTIN) 600 MG tablet    Last refilled on 04/19/2022    Pt last seen on 01/21/2022  Next appt scheduled for NA    Will facilitate refill.    Karley Rowe MA  [facility]      Other request: Pt will be out tomorrow

## 2022-05-24 NOTE — TELEPHONE ENCOUNTER
Spoke with Nevaeh and she will be transferring this prescription to her PCP who is out of the [facility] system for the next refill.    STEPHON PintoN, RN  Care Coordinator  [facility] [place]

## 2022-05-24 NOTE — TELEPHONE ENCOUNTER
Spoke with patient and she will be transferring back to her PCP.    STEPHON PintoN, RN  Care Coordinator  [facility] [place]

## 2022-06-09 ENCOUNTER — TELEPHONE (OUTPATIENT)
Dept: PALLIATIVE MEDICINE | Facility: CLINIC | Age: 22
End: 2022-06-09
Payer: COMMERCIAL

## 2022-12-24 ENCOUNTER — HEALTH MAINTENANCE LETTER (OUTPATIENT)
Age: 22
End: 2022-12-24

## 2023-03-30 NOTE — PROGRESS NOTES
1:1  30 min    Writer met with pt to to discuss her upcoming family meeting with her parents. Pt presented as anxious and scared. Her speech was quickened, she was fidgety, and at times lost her wording due to the emotions that were surfaced for her. Writer helped ground her by guiding her with some deep breathing and self-talk. Writer made it a point that the agenda was going to be direct, goal-oriented, and also future oriented. Writer made it clear that the goal of the meeting is not to argue sides or perspectives but to discuss how they are going to move forward. Writer added that it is going to be difficult for her as well as her parents to not become defensive due to each side holding different truths about what has occurred. Writer again reiterated the importance of not getting into an argument around which perspective is right and who is wrong. Pt was accepting of this, she was receptive, and understanding that falling into that pattern with her parents is not going to be helpful. Writer helped guide pt in setting some expectations that will help her get through the meeting while also moving closer to going home: attempt to stay in control emotionally (take a break, utilize fidget/stress ball, focus on breathing), avoid taking shots at them and avoid the past, and remain respectful and safe. Pt wanted to help pt be prepared for the meeting so they worked on creating a personal agenda for herself so she can make sure that she can express what she needs to. Pt wanted to express her emotions to her parents (abandonment, fear, hurt, that she misses them, that she needs them, and her frustration that she has felt through this situation), express the desire to start doing family therapy with them, voice her needs to work and see her friends, discuss getting back into school, and the continuance of medium IOP. All good things to convey to her family, and it seemed she was in confident place to do so.   reminded her that she should expect that her parents also have an agenda and that with this she should expect to discuss expectations. She voiced that she is fine with expectations but is looking to collaborate with her parents. She remained nervous that her parents were not going to meet with her. It still seemed that pt wants to get into an argument with her parents in order to challenge their perspectives. Despite being proactive with a plan pt remained anxious. It was clear that pt was doing everything in her power to remain calm and in control. Writer reminded her that breaks are always a good tool to use and always allowed. Despite her anxiety pt reported feeling in a good place to be in the same room as her parents.    epistaxis, anemia, low platlets Differential Diagnosis

## 2023-08-17 NOTE — PATIENT INSTRUCTIONS
Patient's (Body mass index is 31.78 kg/mý.) indicates that they are morbidly/severely obese (BMI > 40 or > 35 with obesity - related health condition) with health conditions that include hypertension and dyslipidemias . Weight is improving with treatment. BMI  is above average; BMI management plan is completed. We discussed low calorie, low carb based diet program, portion control, and increasing exercise.    Take Buspar morning of procedure with small sip of water  Tylenol is only thing you can take for pain.    Before Your Child s Surgery or Sedated Procedure      Please call the doctor if there s any change in your child s health, including signs of a cold or flu (sore throat, runny nose, cough, rash or fever). If your child is having surgery, call the surgeon s office. If your child is having another procedure, call your family doctor.    Do not give over-the-counter medicine within 24 hours of the surgery or procedure (unless the doctor tells you to).    If your child takes prescribed drugs: Ask the doctor which medicines are safe to take before the surgery or procedure.    Follow the care team s instructions for eating and drinking before surgery or procedure.     Have your child take a shower or bath the night before surgery, cleaning their skin gently. Use the soap the surgeon gave you. If you were not given special soap, use your regular soap. Do not shave or scrub the surgery site.    Have your child wear clean pajamas and use clean sheets on their bed.

## 2023-10-16 ENCOUNTER — HOSPITAL ENCOUNTER (EMERGENCY)
Facility: CLINIC | Age: 23
Discharge: HOME | End: 2023-10-16
Attending: EMERGENCY MEDICINE | Admitting: EMERGENCY MEDICINE
Payer: COMMERCIAL

## 2023-10-16 VITALS
SYSTOLIC BLOOD PRESSURE: 145 MMHG | HEART RATE: 75 BPM | TEMPERATURE: 99.8 F | DIASTOLIC BLOOD PRESSURE: 103 MMHG | RESPIRATION RATE: 16 BRPM | OXYGEN SATURATION: 97 %

## 2023-10-16 DIAGNOSIS — M25.531 BILATERAL WRIST PAIN: ICD-10-CM

## 2023-10-16 DIAGNOSIS — G56.03 CARPAL TUNNEL SYNDROME, BILATERAL: ICD-10-CM

## 2023-10-16 DIAGNOSIS — M54.2 CERVICALGIA: ICD-10-CM

## 2023-10-16 DIAGNOSIS — M25.532 BILATERAL WRIST PAIN: ICD-10-CM

## 2023-10-16 PROCEDURE — 96372 THER/PROPH/DIAG INJ SC/IM: CPT | Performed by: EMERGENCY MEDICINE

## 2023-10-16 PROCEDURE — 99284 EMERGENCY DEPT VISIT MOD MDM: CPT | Performed by: EMERGENCY MEDICINE

## 2023-10-16 PROCEDURE — 250N000011 HC RX IP 250 OP 636: Mod: JZ | Performed by: EMERGENCY MEDICINE

## 2023-10-16 PROCEDURE — 99284 EMERGENCY DEPT VISIT MOD MDM: CPT | Mod: 25 | Performed by: EMERGENCY MEDICINE

## 2023-10-16 RX ORDER — LORAZEPAM 2 MG/ML
1 INJECTION INTRAMUSCULAR ONCE
Status: COMPLETED | OUTPATIENT
Start: 2023-10-16 | End: 2023-10-16

## 2023-10-16 RX ORDER — KETOROLAC TROMETHAMINE 15 MG/ML
15 INJECTION, SOLUTION INTRAMUSCULAR; INTRAVENOUS ONCE
Status: COMPLETED | OUTPATIENT
Start: 2023-10-16 | End: 2023-10-16

## 2023-10-16 RX ADMIN — KETOROLAC TROMETHAMINE 15 MG: 15 INJECTION, SOLUTION INTRAMUSCULAR; INTRAVENOUS at 20:45

## 2023-10-16 RX ADMIN — LORAZEPAM 1 MG: 2 INJECTION INTRAMUSCULAR; INTRAVENOUS at 20:43

## 2023-10-16 ASSESSMENT — ACTIVITIES OF DAILY LIVING (ADL): ADLS_ACUITY_SCORE: 37

## 2023-10-16 NOTE — Clinical Note
Nevaeh Mccann was seen and treated in our emergency department on 10/16/2023.  She may return to work on 10/20/2023.       If you have any questions or concerns, please don't hesitate to call.      Azam Miner MD

## 2023-10-17 NOTE — ED PROVIDER NOTES
ED Provider Note  [facility]      History     Chief Complaint   Patient presents with    Wrist Pain     Patient reports increasing bi-later wrist pain the past few days. Patient feels like they are going to pass out due to the pain. Extremely restless and anxious in triage.     HPI  Nevaeh Mccann is a 23 year old female with PMH of polysubstance abuse, MDD with anxiety, chronic pain syndrome and fatigue who presents to the ED today with c/o severe increasing bilateral wrist pain for the past several days, with acute worsening this evening prompting visit in the emergency department. Patient reports she has had pain in her wrists bilaterally for a couple days and was given bilateral wrist braces which she has been using. She gets the sensation of numbness affecting her hands and her thumb and first few digits primarily on each side. She denies trauma and cannot think of a particular trigger for the pain. She also notes neck pain and occasional feeling of pain radiating down bilateral arms. Denies trauma or whiplash. No decrease in strength or sensation. No headaches.     Past Medical History  Past Medical History:   Diagnosis Date    Anxiety     Depression     IUD (intrauterine device) in place 08/2017    Kyleena IUD    Polysubstance abuse (H)     started at age 15 years    PTSD (post-traumatic stress disorder)     Self-injurious behavior      Past Surgical History:   Procedure Laterality Date    NO HISTORY OF SURGERY      TONSILLECTOMY       acetaminophen (TYLENOL) 500 MG tablet  famotidine (PEPCID) 20 MG tablet  gabapentin (NEURONTIN) 600 MG tablet  ibuprofen (ADVIL/MOTRIN) 200 MG capsule  levonorgestrel (KYLEENA) 19.5 MG IUD  sucralfate (CARAFATE) 1 GM tablet  Vitamin D, Cholecalciferol, 1000 units CAPS      Allergies   Allergen Reactions    Azithromycin Swelling    Penicillins      Tongue swelling    Sulfamethoxazole-Trimethoprim      Family History  Family History   Problem  Relation Age of Onset    Diabetes Father     Asthma Father     Arrhythmia Father         Atrial Fibrillation    Migraines Mother     Fibromyalgia Mother     Alcoholism Maternal Grandfather     Brain Cancer Maternal Grandfather     Suicide Maternal Uncle         Great Uncle - completed suicide via GSW     Social History   Social History     Tobacco Use    Smoking status: Former     Types: Other    Smokeless tobacco: Never    Tobacco comments:     vaping/E-cigs   Substance Use Topics    Alcohol use: Yes     Alcohol/week: 1.0 standard drink of alcohol     Types: 1 Glasses of wine per week     Comment: rarely    Drug use: Yes     Types: Marijuana     Comment: quit around 6/1/18; over the weekend, has tried LSD and Xanax.        Past medical history, past surgical history, medications, allergies, family history, and social history were reviewed with the patient. No additional pertinent items.      A complete review of systems was performed with pertinent positives and negatives noted in the HPI, and all other systems negative.    Physical Exam   BP: (!) 145/103  Pulse: 75  Temp: 99.8  F (37.7  C)  Resp: 20  SpO2: 97 %  Physical Exam  Vitals reviewed.   Constitutional:       General: She is in acute distress.      Comments: Hyperventilating and screaming about pain   HENT:      Head: Normocephalic and atraumatic.      Nose: No congestion or rhinorrhea.   Eyes:      Extraocular Movements: Extraocular movements intact.      Pupils: Pupils are equal, round, and reactive to light.      Comments: Pupils 5mm bilaterally   Cardiovascular:      Rate and Rhythm: Normal rate and regular rhythm.      Pulses: Normal pulses.      Comments: 2+ radial pulses bilaterally   Pulmonary:      Effort: Pulmonary effort is normal. No respiratory distress.   Abdominal:      Palpations: Abdomen is soft.      Tenderness: There is no abdominal tenderness.   Musculoskeletal:         General: No swelling, deformity or signs of injury.      Cervical  back: Normal range of motion and neck supple. No rigidity or tenderness.      Comments: Tenderness on palpation of carpal tunnels bilaterally; full ROM of wrists with flexion, extension, eversion and inversion ; 5/5  strength bilaterally   Skin:     General: Skin is warm and dry.      Findings: No bruising, erythema or rash.   Neurological:      General: No focal deficit present.      Mental Status: She is alert and oriented to person, place, and time.      Sensory: No sensory deficit.      Motor: No weakness.           ED Course, Procedures, & Data      Procedures                     No results found for any visits on 10/16/23.  Medications   ketorolac (TORADOL) injection 15 mg (15 mg Intramuscular $Given 10/16/23 2045)   LORazepam (ATIVAN) injection 1 mg (1 mg Intramuscular $Given 10/16/23 2043)     Labs Ordered and Resulted from Time of ED Arrival to Time of ED Departure - No data to display  No orders to display          Critical care was not performed.     Medical Decision Making  The patient's presentation was of moderate complexity (an acute complicated injury).    The patient's evaluation involved:  review of external note(s) from 1 sources (see separate area of note for details)    The patient's management necessitated only low risk treatment.    Assessment & Plan      Nevaeh Mccann is a 23 year old female with PMH of polysubstance abuse, MDD with anxiety, chronic pain syndrome and fatigue who presents to the ED today with c/o severe increasing bilateral wrist pain for the past several days, with acute worsening this evening prompting visit in the emergency department.  Patient is initially quite distressed, hyperventilating and yelling out in pain. She was given IM toradol and ativan with significant improvement in all symptoms. We discussed patient's concerns about cervical neck pain; she may have early signs of cervical radiculopathy but no history that would predispose her to this, and a  reassuring neurologic exam with full strength and sensation in bilateral upper extremities proximally and distally. With regard to wrist pain she does have pain in her carpal tunnel regions bilaterally and describes pain impacting her distal median nerve distribution. She already has wrist braces in place. She may have carpal tunnel syndrome though bilateral acute onset is odd. Encouraged patient to follow up and discuss with PCP.  Patient stable for discharge with outpatient follow up and return precautions.     I have reviewed the nursing notes. I have reviewed the findings, diagnosis, plan and need for follow up with the patient.    Discharge Medication List as of 10/16/2023 10:16 PM          Final diagnoses:   Bilateral wrist pain   Carpal tunnel syndrome, bilateral   Cervicalgia       Azam Miner MD  [facility] EMERGENCY DEPARTMENT  10/16/2023     Azam Miner MD  10/17/23 0135

## 2023-10-17 NOTE — ED TRIAGE NOTES
Patient reports increasing bi-later wrist pain the past few days. Patient feels like they are going to pass out due to the pain. Extremely restless and anxious in triage.     Triage Assessment (Adult)       Row Name 10/16/23 2019          Triage Assessment    Airway WDL WDL        Respiratory WDL    Respiratory WDL WDL        Skin Circulation/Temperature WDL    Skin Circulation/Temperature WDL WDL        Cardiac WDL    Cardiac WDL WDL        Peripheral/Neurovascular WDL    Peripheral Neurovascular WDL WDL        Cognitive/Neuro/Behavioral WDL    Cognitive/Neuro/Behavioral WDL WDL

## 2023-10-17 NOTE — DISCHARGE INSTRUCTIONS
Please follow up with your primary care provider for further evaluation of neck pain and bilateral wrist pain. Might represent cervical radiculopathy vs bilateral carpal tunnel syndrome. Please take medicine as prescribed, and take ibuprofen for the next few days and rest your wrists. Return if worsening concerning symptoms.

## 2023-10-19 ENCOUNTER — DOCUMENTATION ONLY (OUTPATIENT)
Dept: OTHER | Facility: CLINIC | Age: 23
End: 2023-10-19
Payer: COMMERCIAL

## 2024-02-03 ENCOUNTER — HEALTH MAINTENANCE LETTER (OUTPATIENT)
Age: 24
End: 2024-02-03

## 2025-02-16 ENCOUNTER — TELEPHONE (OUTPATIENT)
Dept: BEHAVIORAL HEALTH | Facility: CLINIC | Age: 25
End: 2025-02-16

## 2025-02-16 ENCOUNTER — HOSPITAL ENCOUNTER (EMERGENCY)
Facility: CLINIC | Age: 25
Discharge: HOME | End: 2025-02-18
Attending: FAMILY MEDICINE | Admitting: FAMILY MEDICINE
Payer: COMMERCIAL

## 2025-02-16 DIAGNOSIS — R45.851 SUICIDAL IDEATION: ICD-10-CM

## 2025-02-16 DIAGNOSIS — F50.9 EATING DISORDER, UNSPECIFIED TYPE: ICD-10-CM

## 2025-02-16 DIAGNOSIS — F41.0 PANIC ATTACK: ICD-10-CM

## 2025-02-16 PROBLEM — F33.1 MAJOR DEPRESSIVE DISORDER, RECURRENT EPISODE, MODERATE (H): Status: ACTIVE | Noted: 2025-02-16

## 2025-02-16 LAB
ALBUMIN SERPL BCG-MCNC: 4.8 G/DL (ref 3.5–5.2)
ALBUMIN UR-MCNC: NEGATIVE MG/DL
ALP SERPL-CCNC: 53 U/L (ref 40–150)
ALT SERPL W P-5'-P-CCNC: 13 U/L (ref 0–50)
AMPHETAMINES UR QL SCN: ABNORMAL
ANION GAP SERPL CALCULATED.3IONS-SCNC: 12 MMOL/L (ref 7–15)
APPEARANCE UR: CLEAR
AST SERPL W P-5'-P-CCNC: 19 U/L (ref 0–45)
ATRIAL RATE - MUSE: 130 BPM
BACTERIA #/AREA URNS HPF: ABNORMAL /HPF
BARBITURATES UR QL SCN: ABNORMAL
BASOPHILS # BLD AUTO: 0 10E3/UL (ref 0–0.2)
BASOPHILS NFR BLD AUTO: 0 %
BENZODIAZ UR QL SCN: ABNORMAL
BILIRUB SERPL-MCNC: 0.2 MG/DL
BILIRUB UR QL STRIP: NEGATIVE
BUN SERPL-MCNC: 10.3 MG/DL (ref 6–20)
BZE UR QL SCN: ABNORMAL
CALCIUM SERPL-MCNC: 10 MG/DL (ref 8.8–10.4)
CANNABINOIDS UR QL SCN: ABNORMAL
CHLORIDE SERPL-SCNC: 102 MMOL/L (ref 98–107)
COLOR UR AUTO: ABNORMAL
CREAT SERPL-MCNC: 0.59 MG/DL (ref 0.51–0.95)
DIASTOLIC BLOOD PRESSURE - MUSE: NORMAL MMHG
EGFRCR SERPLBLD CKD-EPI 2021: >90 ML/MIN/1.73M2
EOSINOPHIL # BLD AUTO: 0 10E3/UL (ref 0–0.7)
EOSINOPHIL NFR BLD AUTO: 0 %
ERYTHROCYTE [DISTWIDTH] IN BLOOD BY AUTOMATED COUNT: 11.4 % (ref 10–15)
FENTANYL UR QL: ABNORMAL
GLUCOSE SERPL-MCNC: 113 MG/DL (ref 70–99)
GLUCOSE UR STRIP-MCNC: NEGATIVE MG/DL
HCG UR QL: NEGATIVE
HCO3 SERPL-SCNC: 24 MMOL/L (ref 22–29)
HCT VFR BLD AUTO: 43.2 % (ref 35–47)
HGB BLD-MCNC: 14.9 G/DL (ref 11.7–15.7)
HGB UR QL STRIP: NEGATIVE
IMM GRANULOCYTES # BLD: 0 10E3/UL
IMM GRANULOCYTES NFR BLD: 0 %
INTERPRETATION ECG - MUSE: NORMAL
KETONES UR STRIP-MCNC: NEGATIVE MG/DL
LEUKOCYTE ESTERASE UR QL STRIP: ABNORMAL
LYMPHOCYTES # BLD AUTO: 2 10E3/UL (ref 0.8–5.3)
LYMPHOCYTES NFR BLD AUTO: 19 %
MCH RBC QN AUTO: 30.9 PG (ref 26.5–33)
MCHC RBC AUTO-ENTMCNC: 34.5 G/DL (ref 31.5–36.5)
MCV RBC AUTO: 90 FL (ref 78–100)
MONOCYTES # BLD AUTO: 0.5 10E3/UL (ref 0–1.3)
MONOCYTES NFR BLD AUTO: 5 %
NEUTROPHILS # BLD AUTO: 8.1 10E3/UL (ref 1.6–8.3)
NEUTROPHILS NFR BLD AUTO: 76 %
NITRATE UR QL: NEGATIVE
NRBC # BLD AUTO: 0 10E3/UL
NRBC BLD AUTO-RTO: 0 /100
OPIATES UR QL SCN: ABNORMAL
P AXIS - MUSE: 69 DEGREES
PCP QUAL URINE (ROCHE): ABNORMAL
PH UR STRIP: 6.5 [PH] (ref 5–7)
PLATELET # BLD AUTO: 266 10E3/UL (ref 150–450)
POTASSIUM SERPL-SCNC: 4 MMOL/L (ref 3.4–5.3)
PR INTERVAL - MUSE: 122 MS
PROT SERPL-MCNC: 7.3 G/DL (ref 6.4–8.3)
QRS DURATION - MUSE: 82 MS
QT - MUSE: 296 MS
QTC - MUSE: 435 MS
R AXIS - MUSE: 88 DEGREES
RBC # BLD AUTO: 4.82 10E6/UL (ref 3.8–5.2)
RBC URINE: 2 /HPF
SODIUM SERPL-SCNC: 138 MMOL/L (ref 135–145)
SP GR UR STRIP: 1 (ref 1–1.03)
SQUAMOUS EPITHELIAL: 1 /HPF
SYSTOLIC BLOOD PRESSURE - MUSE: NORMAL MMHG
T AXIS - MUSE: 31 DEGREES
TRANSITIONAL EPI: <1 /HPF
UROBILINOGEN UR STRIP-MCNC: NORMAL MG/DL
VENTRICULAR RATE- MUSE: 130 BPM
WBC # BLD AUTO: 10.7 10E3/UL (ref 4–11)
WBC URINE: 3 /HPF

## 2025-02-16 PROCEDURE — 250N000011 HC RX IP 250 OP 636: Performed by: FAMILY MEDICINE

## 2025-02-16 PROCEDURE — 82040 ASSAY OF SERUM ALBUMIN: CPT | Performed by: FAMILY MEDICINE

## 2025-02-16 PROCEDURE — 99285 EMERGENCY DEPT VISIT HI MDM: CPT | Performed by: FAMILY MEDICINE

## 2025-02-16 PROCEDURE — 81025 URINE PREGNANCY TEST: CPT | Performed by: FAMILY MEDICINE

## 2025-02-16 PROCEDURE — 82565 ASSAY OF CREATININE: CPT | Performed by: FAMILY MEDICINE

## 2025-02-16 PROCEDURE — 36415 COLL VENOUS BLD VENIPUNCTURE: CPT | Performed by: FAMILY MEDICINE

## 2025-02-16 PROCEDURE — 250N000013 HC RX MED GY IP 250 OP 250 PS 637: Performed by: FAMILY MEDICINE

## 2025-02-16 PROCEDURE — 93010 ELECTROCARDIOGRAM REPORT: CPT | Performed by: FAMILY MEDICINE

## 2025-02-16 PROCEDURE — 93005 ELECTROCARDIOGRAM TRACING: CPT | Performed by: FAMILY MEDICINE

## 2025-02-16 PROCEDURE — 96361 HYDRATE IV INFUSION ADD-ON: CPT | Performed by: FAMILY MEDICINE

## 2025-02-16 PROCEDURE — 85004 AUTOMATED DIFF WBC COUNT: CPT | Performed by: FAMILY MEDICINE

## 2025-02-16 PROCEDURE — 99285 EMERGENCY DEPT VISIT HI MDM: CPT | Mod: 25 | Performed by: FAMILY MEDICINE

## 2025-02-16 PROCEDURE — 96374 THER/PROPH/DIAG INJ IV PUSH: CPT

## 2025-02-16 PROCEDURE — 80307 DRUG TEST PRSMV CHEM ANLYZR: CPT | Performed by: FAMILY MEDICINE

## 2025-02-16 PROCEDURE — 258N000003 HC RX IP 258 OP 636: Performed by: FAMILY MEDICINE

## 2025-02-16 PROCEDURE — 81001 URINALYSIS AUTO W/SCOPE: CPT | Performed by: FAMILY MEDICINE

## 2025-02-16 RX ORDER — HYDROXYZINE HYDROCHLORIDE 25 MG/1
25 TABLET, FILM COATED ORAL EVERY 6 HOURS PRN
Status: DISCONTINUED | OUTPATIENT
Start: 2025-02-16 | End: 2025-02-18 | Stop reason: HOSPADM

## 2025-02-16 RX ORDER — HYDROXYZINE HYDROCHLORIDE 25 MG/1
50 TABLET, FILM COATED ORAL EVERY 6 HOURS PRN
Status: DISCONTINUED | OUTPATIENT
Start: 2025-02-16 | End: 2025-02-18 | Stop reason: HOSPADM

## 2025-02-16 RX ORDER — OMEGA-3 FATTY ACIDS/FISH OIL 300-1000MG
200 CAPSULE ORAL EVERY 4 HOURS PRN
Status: DISCONTINUED | OUTPATIENT
Start: 2025-02-16 | End: 2025-02-18 | Stop reason: HOSPADM

## 2025-02-16 RX ORDER — VITAMIN B COMPLEX
1000 TABLET ORAL DAILY
Status: DISCONTINUED | OUTPATIENT
Start: 2025-02-17 | End: 2025-02-18 | Stop reason: HOSPADM

## 2025-02-16 RX ORDER — DEXTROAMPHETAMINE SACCHARATE, AMPHETAMINE ASPARTATE, DEXTROAMPHETAMINE SULFATE AND AMPHETAMINE SULFATE 2.5; 2.5; 2.5; 2.5 MG/1; MG/1; MG/1; MG/1
10 TABLET ORAL
COMMUNITY

## 2025-02-16 RX ORDER — GABAPENTIN 300 MG/1
900 CAPSULE ORAL 3 TIMES DAILY
Status: DISCONTINUED | OUTPATIENT
Start: 2025-02-16 | End: 2025-02-18 | Stop reason: HOSPADM

## 2025-02-16 RX ORDER — BUSPIRONE HYDROCHLORIDE 10 MG/1
10 TABLET ORAL 2 TIMES DAILY
COMMUNITY

## 2025-02-16 RX ORDER — LORAZEPAM 2 MG/ML
0.5 INJECTION INTRAMUSCULAR ONCE
Status: COMPLETED | OUTPATIENT
Start: 2025-02-16 | End: 2025-02-16

## 2025-02-16 RX ORDER — DEXTROAMPHETAMINE SACCHARATE, AMPHETAMINE ASPARTATE MONOHYDRATE, DEXTROAMPHETAMINE SULFATE AND AMPHETAMINE SULFATE 6.25; 6.25; 6.25; 6.25 MG/1; MG/1; MG/1; MG/1
25 CAPSULE, EXTENDED RELEASE ORAL DAILY
Status: DISCONTINUED | OUTPATIENT
Start: 2025-02-17 | End: 2025-02-17

## 2025-02-16 RX ORDER — DEXTROAMPHETAMINE SACCHARATE, AMPHETAMINE ASPARTATE MONOHYDRATE, DEXTROAMPHETAMINE SULFATE AND AMPHETAMINE SULFATE 6.25; 6.25; 6.25; 6.25 MG/1; MG/1; MG/1; MG/1
1 CAPSULE, EXTENDED RELEASE ORAL EVERY MORNING
COMMUNITY

## 2025-02-16 RX ORDER — BUSPIRONE HYDROCHLORIDE 10 MG/1
10 TABLET ORAL 2 TIMES DAILY
Status: DISCONTINUED | OUTPATIENT
Start: 2025-02-16 | End: 2025-02-18 | Stop reason: HOSPADM

## 2025-02-16 RX ORDER — DEXTROAMPHETAMINE SACCHARATE, AMPHETAMINE ASPARTATE, DEXTROAMPHETAMINE SULFATE AND AMPHETAMINE SULFATE 2.5; 2.5; 2.5; 2.5 MG/1; MG/1; MG/1; MG/1
10 TABLET ORAL DAILY
Status: DISCONTINUED | OUTPATIENT
Start: 2025-02-17 | End: 2025-02-18 | Stop reason: HOSPADM

## 2025-02-16 RX ORDER — ACETAMINOPHEN 500 MG
500-1000 TABLET ORAL EVERY 6 HOURS PRN
Status: DISCONTINUED | OUTPATIENT
Start: 2025-02-16 | End: 2025-02-18 | Stop reason: HOSPADM

## 2025-02-16 RX ADMIN — BUSPIRONE HYDROCHLORIDE 10 MG: 10 TABLET ORAL at 22:10

## 2025-02-16 RX ADMIN — GABAPENTIN 900 MG: 300 CAPSULE ORAL at 22:10

## 2025-02-16 RX ADMIN — HYDROXYZINE HYDROCHLORIDE 50 MG: 50 TABLET ORAL at 22:58

## 2025-02-16 RX ADMIN — SODIUM CHLORIDE 1000 ML: 9 INJECTION, SOLUTION INTRAVENOUS at 18:45

## 2025-02-16 RX ADMIN — LORAZEPAM 0.5 MG: 2 INJECTION INTRAMUSCULAR; INTRAVENOUS at 18:45

## 2025-02-16 ASSESSMENT — ACTIVITIES OF DAILY LIVING (ADL)
ADLS_ACUITY_SCORE: 41

## 2025-02-16 ASSESSMENT — COLUMBIA-SUICIDE SEVERITY RATING SCALE - C-SSRS
4. HAVE YOU HAD THESE THOUGHTS AND HAD SOME INTENTION OF ACTING ON THEM?: YES
6. HAVE YOU EVER DONE ANYTHING, STARTED TO DO ANYTHING, OR PREPARED TO DO ANYTHING TO END YOUR LIFE?: YES
3. HAVE YOU BEEN THINKING ABOUT HOW YOU MIGHT KILL YOURSELF?: NO
2. HAVE YOU ACTUALLY HAD ANY THOUGHTS OF KILLING YOURSELF IN THE PAST MONTH?: YES
1. IN THE PAST MONTH, HAVE YOU WISHED YOU WERE DEAD OR WISHED YOU COULD GO TO SLEEP AND NOT WAKE UP?: YES
5. HAVE YOU STARTED TO WORK OUT OR WORKED OUT THE DETAILS OF HOW TO KILL YOURSELF? DO YOU INTEND TO CARRY OUT THIS PLAN?: NO

## 2025-02-16 NOTE — ED PROVIDER NOTES
ED Provider Note  [facility]      History     Chief Complaint   Patient presents with    Psychiatric Evaluation    Suicidal     HPI  Nevaeh Mccann is a 24 year old female with a history of polysubstance use disorder, MDD and previous suicide attempt who presents to the ED for mental health assessment.  Patient has a long history of psychiatric concerns as well as history of eating disorder currently she is having increased suicidal thoughts and increased anxiety and having panic attacks patient is supposed to be entering into a day treatment program for eating disorder at [facility] but has not started the program yet is feeling overwhelmed and is not able to maintain safety.    Past Medical History  Past Medical History:   Diagnosis Date    Anxiety     Depression     IUD (intrauterine device) in place 08/2017    Kyleena IUD    Polysubstance abuse (H)     started at age 15 years    PTSD (post-traumatic stress disorder)     Self-injurious behavior      Past Surgical History:   Procedure Laterality Date    NO HISTORY OF SURGERY      TONSILLECTOMY       amphetamine-dextroamphetamine (ADDERALL XR) 25 MG 24 hr capsule  amphetamine-dextroamphetamine (ADDERALL) 10 MG tablet  busPIRone (BUSPAR) 10 MG tablet  gabapentin (NEURONTIN) 600 MG tablet  acetaminophen (TYLENOL) 500 MG tablet  famotidine (PEPCID) 20 MG tablet  ibuprofen (ADVIL/MOTRIN) 200 MG capsule  levonorgestrel (KYLEENA) 19.5 MG IUD  sucralfate (CARAFATE) 1 GM tablet  Vitamin D, Cholecalciferol, 1000 units CAPS      Allergies   Allergen Reactions    Azithromycin Swelling    Penicillins      Tongue swelling    Sulfamethoxazole-Trimethoprim      Family History  Family History   Problem Relation Age of Onset    Diabetes Father     Asthma Father     Arrhythmia Father         Atrial Fibrillation    Migraines Mother     Fibromyalgia Mother     Alcoholism Maternal Grandfather     Brain Cancer Maternal Grandfather     Suicide Maternal  "Uncle         Great Uncle - completed suicide via W     Social History   Social History     Tobacco Use    Smoking status: Former     Types: Other    Smokeless tobacco: Never   Substance Use Topics    Alcohol use: Yes     Alcohol/week: 1.0 standard drink of alcohol     Types: 1 Glasses of wine per week     Comment: rarely    Drug use: Yes     Types: Marijuana     Comment: quit around 6/1/18; over the weekend, has tried LSD and Xanax.        Past medical history, past surgical history, medications, allergies, family history, and social history were reviewed with the patient. No additional pertinent items.   A medically appropriate review of systems was performed with pertinent positives and negatives noted in the HPI, and all other systems negative.    Physical Exam   BP: 128/77  Pulse: (S) (!) 128  Temp: 98  F (36.7  C)  Resp: 16  Height: 160 cm (5' 3\")  Weight: 58.2 kg (128 lb 3.2 oz)  SpO2: 98 %  Physical Exam  Constitutional:       General: She is not in acute distress.     Appearance: Normal appearance. She is not diaphoretic.   HENT:      Head: Atraumatic.      Mouth/Throat:      Mouth: Mucous membranes are moist.   Eyes:      General: No scleral icterus.     Conjunctiva/sclera: Conjunctivae normal.   Cardiovascular:      Rate and Rhythm: Normal rate.      Heart sounds: Normal heart sounds.   Pulmonary:      Effort: No respiratory distress.      Breath sounds: Normal breath sounds.   Abdominal:      General: Abdomen is flat.   Musculoskeletal:      Cervical back: Neck supple.   Skin:     General: Skin is warm.      Findings: No rash.   Neurological:      General: No focal deficit present.      Mental Status: She is alert and oriented to person, place, and time.      Sensory: No sensory deficit.      Motor: No weakness.      Coordination: Coordination normal.   Psychiatric:         Mood and Affect: Mood is anxious.         Speech: Speech normal.         Behavior: Behavior is cooperative.         Thought " Content: Thought content includes suicidal ideation.           ED Course, Procedures, & Data      Procedures       Results for orders placed or performed during the hospital encounter of 02/16/25   HCG qualitative urine (UPT)     Status: Normal   Result Value Ref Range    hCG Urine Qualitative Negative Negative   Comprehensive metabolic panel     Status: Abnormal   Result Value Ref Range    Sodium 138 135 - 145 mmol/L    Potassium 4.0 3.4 - 5.3 mmol/L    Carbon Dioxide (CO2) 24 22 - 29 mmol/L    Anion Gap 12 7 - 15 mmol/L    Urea Nitrogen 10.3 6.0 - 20.0 mg/dL    Creatinine 0.59 0.51 - 0.95 mg/dL    GFR Estimate >90 >60 mL/min/1.73m2    Calcium 10.0 8.8 - 10.4 mg/dL    Chloride 102 98 - 107 mmol/L    Glucose 113 (H) 70 - 99 mg/dL    Alkaline Phosphatase 53 40 - 150 U/L    AST 19 0 - 45 U/L    ALT 13 0 - 50 U/L    Protein Total 7.3 6.4 - 8.3 g/dL    Albumin 4.8 3.5 - 5.2 g/dL    Bilirubin Total 0.2 <=1.2 mg/dL   UA with Microscopic reflex to Culture     Status: Abnormal    Specimen: Urine, Midstream   Result Value Ref Range    Color Urine Straw Colorless, Straw, Light Yellow, Yellow    Appearance Urine Clear Clear    Glucose Urine Negative Negative mg/dL    Bilirubin Urine Negative Negative    Ketones Urine Negative Negative mg/dL    Specific Gravity Urine 1.005 1.003 - 1.035    Blood Urine Negative Negative    pH Urine 6.5 5.0 - 7.0    Protein Albumin Urine Negative Negative mg/dL    Urobilinogen Urine Normal Normal, 2.0 mg/dL    Nitrite Urine Negative Negative    Leukocyte Esterase Urine Small (A) Negative    Bacteria Urine Few (A) None Seen /HPF    RBC Urine 2 <=2 /HPF    WBC Urine 3 <=5 /HPF    Squamous Epithelials Urine 1 <=1 /HPF    Transitional Epithelials Urine <1 <=1 /HPF    Narrative    Urine Culture not indicated   Urine Drug Screen Panel     Status: Abnormal   Result Value Ref Range    Amphetamines Urine Screen Positive (A) Screen Negative    Barbituates Urine Screen Negative Screen Negative     Benzodiazepine Urine Screen Negative Screen Negative    Cannabinoids Urine Screen Positive (A) Screen Negative    Cocaine Urine Screen Negative Screen Negative    Fentanyl Qual Urine Screen Negative Screen Negative    Opiates Urine Screen Negative Screen Negative    PCP Urine Screen Negative Screen Negative   CBC with platelets and differential     Status: None   Result Value Ref Range    WBC Count 10.7 4.0 - 11.0 10e3/uL    RBC Count 4.82 3.80 - 5.20 10e6/uL    Hemoglobin 14.9 11.7 - 15.7 g/dL    Hematocrit 43.2 35.0 - 47.0 %    MCV 90 78 - 100 fL    MCH 30.9 26.5 - 33.0 pg    MCHC 34.5 31.5 - 36.5 g/dL    RDW 11.4 10.0 - 15.0 %    Platelet Count 266 150 - 450 10e3/uL    % Neutrophils 76 %    % Lymphocytes 19 %    % Monocytes 5 %    % Eosinophils 0 %    % Basophils 0 %    % Immature Granulocytes 0 %    NRBCs per 100 WBC 0 <1 /100    Absolute Neutrophils 8.1 1.6 - 8.3 10e3/uL    Absolute Lymphocytes 2.0 0.8 - 5.3 10e3/uL    Absolute Monocytes 0.5 0.0 - 1.3 10e3/uL    Absolute Eosinophils 0.0 0.0 - 0.7 10e3/uL    Absolute Basophils 0.0 0.0 - 0.2 10e3/uL    Absolute Immature Granulocytes 0.0 <=0.4 10e3/uL    Absolute NRBCs 0.0 10e3/uL   EKG 12-lead, tracing only     Status: None   Result Value Ref Range    Systolic Blood Pressure  mmHg    Diastolic Blood Pressure  mmHg    Ventricular Rate 130 BPM    Atrial Rate 130 BPM    NE Interval 122 ms    QRS Duration 82 ms     ms    QTc 435 ms    P Axis 69 degrees    R AXIS 88 degrees    T Axis 31 degrees    Interpretation ECG       Sinus tachycardia  Otherwise normal ECG  Unconfirmed report - interpretation of this ECG is computer generated - see medical record for final interpretation    Confirmed by - EMERGENCY ROOM, PHYSICIAN ([id number]),  KATHERINE ANDUJAR ([id number]) on 2/16/2025 6:57:24 PM     Urine Drug Screen     Status: Abnormal    Narrative    The following orders were created for panel order Urine Drug Screen.  Procedure                               Abnormality          Status                     ---------                               -----------         ------                     Urine Drug Screen Panel[[id number]]      Abnormal            Final result                 Please view results for these tests on the individual orders.   CBC with platelets differential     Status: None    Narrative    The following orders were created for panel order CBC with platelets differential.  Procedure                               Abnormality         Status                     ---------                               -----------         ------                     CBC with platelets and d...[[id number]]                      Final result                 Please view results for these tests on the individual orders.     Medications   acetaminophen (TYLENOL) tablet 500-1,000 mg (has no administration in time range)   amphetamine-dextroamphetamine (ADDERALL XR) ER cap 25 mg (has no administration in time range)   amphetamine-dextroamphetamine (ADDERALL) per tablet 10 mg (has no administration in time range)   busPIRone (BUSPAR) tablet 10 mg (10 mg Oral $Given 2/16/25 2210)   gabapentin (NEURONTIN) capsule 900 mg (900 mg Oral $Given 2/16/25 2210)   ibuprofen (ADVIL/MOTRIN) CAPS 200 mg (has no administration in time range)   Vitamin D3 (CHOLECALCIFEROL) tablet 1,000 Units (has no administration in time range)   hydrOXYzine HCl (ATARAX) tablet 25 mg ( Oral See Alternative 2/16/25 2258)     Or   hydrOXYzine HCl (ATARAX) tablet 50 mg (50 mg Oral $Given 2/16/25 2258)   sodium chloride 0.9% BOLUS 1,000 mL (1,000 mLs Intravenous $New Bag 2/16/25 1845)   LORazepam (ATIVAN) injection 0.5 mg (0.5 mg Intravenous $Given 2/16/25 1845)     Labs Ordered and Resulted from Time of ED Arrival to Time of ED Departure   COMPREHENSIVE METABOLIC PANEL - Abnormal       Result Value    Sodium 138      Potassium 4.0      Carbon Dioxide (CO2) 24      Anion Gap 12      Urea Nitrogen 10.3      Creatinine 0.59      GFR Estimate >90       Calcium 10.0      Chloride 102      Glucose 113 (*)     Alkaline Phosphatase 53      AST 19      ALT 13      Protein Total 7.3      Albumin 4.8      Bilirubin Total 0.2     ROUTINE UA WITH MICROSCOPIC REFLEX TO CULTURE - Abnormal    Color Urine Straw      Appearance Urine Clear      Glucose Urine Negative      Bilirubin Urine Negative      Ketones Urine Negative      Specific Gravity Urine 1.005      Blood Urine Negative      pH Urine 6.5      Protein Albumin Urine Negative      Urobilinogen Urine Normal      Nitrite Urine Negative      Leukocyte Esterase Urine Small (*)     Bacteria Urine Few (*)     RBC Urine 2      WBC Urine 3      Squamous Epithelials Urine 1      Transitional Epithelials Urine <1     URINE DRUG SCREEN PANEL - Abnormal    Amphetamines Urine Screen Positive (*)     Barbituates Urine Screen Negative      Benzodiazepine Urine Screen Negative      Cannabinoids Urine Screen Positive (*)     Cocaine Urine Screen Negative      Fentanyl Qual Urine Screen Negative      Opiates Urine Screen Negative      PCP Urine Screen Negative     HCG QUALITATIVE URINE - Normal    hCG Urine Qualitative Negative     CBC WITH PLATELETS AND DIFFERENTIAL    WBC Count 10.7      RBC Count 4.82      Hemoglobin 14.9      Hematocrit 43.2      MCV 90      MCH 30.9      MCHC 34.5      RDW 11.4      Platelet Count 266      % Neutrophils 76      % Lymphocytes 19      % Monocytes 5      % Eosinophils 0      % Basophils 0      % Immature Granulocytes 0      NRBCs per 100 WBC 0      Absolute Neutrophils 8.1      Absolute Lymphocytes 2.0      Absolute Monocytes 0.5      Absolute Eosinophils 0.0      Absolute Basophils 0.0      Absolute Immature Granulocytes 0.0      Absolute NRBCs 0.0       No orders to display          Critical care was not performed.     Medical Decision Making  The patient's presentation was of high complexity (a chronic illness severe exacerbation, progression, or side effect of treatment).    The patient's  evaluation involved:  ordering and/or review of 3+ test(s) in this encounter (see separate area of note for details)  discussion of management or test interpretation with another health professional (patient was seen by independent practitioner for DEC assessment and at this time had discussion of inpatient versus outpatient management we agreed that the patient will require inpatient stabilization and will be admitted to an inpatient psychiatric unit.)    The patient's management necessitated high risk (a decision regarding hospitalization).    Assessment & Plan        I have reviewed the nursing notes. I have reviewed the findings, diagnosis, plan and need for follow up with the patient.        Final diagnoses:   Suicidal ideation   Panic attack   Eating disorder, unspecified type         [facility] EMERGENCY DEPARTMENT  2/16/2025     Mike Vance MD  02/16/25 [id number]

## 2025-02-16 NOTE — ED TRIAGE NOTES
Pt here for mental health assessment.  Pt has been having increased panic attacks, suicidal thoughts but no plan, having a hard time sleeping and eating.    Pt notes increase stress in life.    Pt shares a history of previous hospitalization when she was a teen.    Pt shares anxiety is severe currently.

## 2025-02-17 ENCOUNTER — TELEPHONE (OUTPATIENT)
Dept: BEHAVIORAL HEALTH | Facility: CLINIC | Age: 25
End: 2025-02-17
Payer: COMMERCIAL

## 2025-02-17 LAB — SARS-COV-2 RNA RESP QL NAA+PROBE: NEGATIVE

## 2025-02-17 PROCEDURE — 250N000013 HC RX MED GY IP 250 OP 250 PS 637: Performed by: PSYCHIATRY & NEUROLOGY

## 2025-02-17 PROCEDURE — 99285 EMERGENCY DEPT VISIT HI MDM: CPT | Performed by: PSYCHIATRY & NEUROLOGY

## 2025-02-17 PROCEDURE — 96361 HYDRATE IV INFUSION ADD-ON: CPT | Performed by: FAMILY MEDICINE

## 2025-02-17 PROCEDURE — 250N000013 HC RX MED GY IP 250 OP 250 PS 637: Performed by: FAMILY MEDICINE

## 2025-02-17 PROCEDURE — 87635 SARS-COV-2 COVID-19 AMP PRB: CPT | Performed by: EMERGENCY MEDICINE

## 2025-02-17 RX ORDER — PHENOL 1.4 %
10 AEROSOL, SPRAY (ML) MUCOUS MEMBRANE
COMMUNITY

## 2025-02-17 RX ORDER — VITAMIN B COMPLEX
1 TABLET ORAL EVERY MORNING
COMMUNITY

## 2025-02-17 RX ORDER — ONDANSETRON 4 MG/1
4 TABLET, ORALLY DISINTEGRATING ORAL EVERY 8 HOURS PRN
COMMUNITY

## 2025-02-17 RX ORDER — DULOXETIN HYDROCHLORIDE 60 MG/1
60 CAPSULE, DELAYED RELEASE ORAL DAILY
Status: DISCONTINUED | OUTPATIENT
Start: 2025-02-17 | End: 2025-02-18 | Stop reason: HOSPADM

## 2025-02-17 RX ORDER — DULOXETIN HYDROCHLORIDE 60 MG/1
60 CAPSULE, DELAYED RELEASE ORAL EVERY MORNING
COMMUNITY

## 2025-02-17 RX ORDER — HYDROXYZINE HYDROCHLORIDE 25 MG/1
25 TABLET, FILM COATED ORAL 2 TIMES DAILY PRN
COMMUNITY
Start: 2025-02-13

## 2025-02-17 RX ORDER — PROCHLORPERAZINE MALEATE 10 MG
10 TABLET ORAL EVERY 8 HOURS PRN
COMMUNITY

## 2025-02-17 RX ORDER — GABAPENTIN 300 MG/1
600 CAPSULE ORAL 2 TIMES DAILY
COMMUNITY
Start: 2025-02-16

## 2025-02-17 RX ORDER — DEXTROAMPHETAMINE SACCHARATE, AMPHETAMINE ASPARTATE MONOHYDRATE, DEXTROAMPHETAMINE SULFATE AND AMPHETAMINE SULFATE 1.25; 1.25; 1.25; 1.25 MG/1; MG/1; MG/1; MG/1
5 CAPSULE, EXTENDED RELEASE ORAL DAILY
Status: DISCONTINUED | OUTPATIENT
Start: 2025-02-17 | End: 2025-02-18 | Stop reason: HOSPADM

## 2025-02-17 RX ORDER — DEXTROAMPHETAMINE SACCHARATE, AMPHETAMINE ASPARTATE MONOHYDRATE, DEXTROAMPHETAMINE SULFATE AND AMPHETAMINE SULFATE 5; 5; 5; 5 MG/1; MG/1; MG/1; MG/1
20 CAPSULE, EXTENDED RELEASE ORAL DAILY
Status: DISCONTINUED | OUTPATIENT
Start: 2025-02-17 | End: 2025-02-18 | Stop reason: HOSPADM

## 2025-02-17 RX ORDER — CALCIUM CARBONATE 500 MG/1
1 TABLET, CHEWABLE ORAL DAILY PRN
COMMUNITY

## 2025-02-17 RX ADMIN — DULOXETINE HYDROCHLORIDE 60 MG: 60 CAPSULE, DELAYED RELEASE ORAL at 14:57

## 2025-02-17 RX ADMIN — HYDROXYZINE HYDROCHLORIDE 50 MG: 50 TABLET ORAL at 18:23

## 2025-02-17 RX ADMIN — GABAPENTIN 900 MG: 300 CAPSULE ORAL at 16:15

## 2025-02-17 RX ADMIN — BUSPIRONE HYDROCHLORIDE 10 MG: 10 TABLET ORAL at 11:17

## 2025-02-17 RX ADMIN — BUSPIRONE HYDROCHLORIDE 10 MG: 10 TABLET ORAL at 22:20

## 2025-02-17 RX ADMIN — DEXTROAMPHETAMINE SULFATE, DEXTROAMPHETAMINE SACCHARATE, AMPHETAMINE SULFATE AND AMPHETAMINE ASPARTATE 5 MG: 1.25; 1.25; 1.25; 1.25 CAPSULE, EXTENDED RELEASE ORAL at 11:17

## 2025-02-17 RX ADMIN — GABAPENTIN 900 MG: 300 CAPSULE ORAL at 11:17

## 2025-02-17 RX ADMIN — Medication 10 MG: at 01:53

## 2025-02-17 RX ADMIN — Medication 10 MG: at 21:38

## 2025-02-17 RX ADMIN — GABAPENTIN 900 MG: 300 CAPSULE ORAL at 21:38

## 2025-02-17 RX ADMIN — DEXTROAMPHETAMINE SULFATE, DEXTROAMPHETAMINE SACCHARATE, AMPHETAMINE SULFATE AND AMPHETAMINE ASPARTATE 20 MG: 5; 5; 5; 5 CAPSULE, EXTENDED RELEASE ORAL at 11:17

## 2025-02-17 RX ADMIN — DEXTROAMPHETAMINE SACCHARATE, AMPHETAMINE ASPARTATE, DEXTROAMPHETAMINE SULFATE, AMPHETAMINE SULFATE TABLETS, 10 MG,CLL 10 MG: 2.5; 2.5; 2.5; 2.5 TABLET ORAL at 14:08

## 2025-02-17 RX ADMIN — Medication 1000 UNITS: at 11:17

## 2025-02-17 ASSESSMENT — ACTIVITIES OF DAILY LIVING (ADL)
ADLS_ACUITY_SCORE: 41

## 2025-02-17 NOTE — PROGRESS NOTES
IP MH Referral Acuity Rating Score (RARS)    LMHP complete at referral to IP MH, with DEC; and, daily while awaiting IP MH placement. Call score to PPS.  CRITERIA SCORING   New 73 HH and Involuntary for IP MH (not adolescent) 0/3   Boarding over 24 hours 0/1   Vulnerable adult at least 55+ with multiple co morbidities; or, Patient age 11 or under 0/1   Suicide ideation without relief of precipitating factors 1/1   Current plan for suicide 0/1   Current plan for homicide 0/1   Imminent risk or actual attempt to seriously harm another without relief of factors precipitating the attempt 0/1   Severe dysfunction in daily living (ex: complete neglect for self care, extreme disruption in vegetative function, extreme deterioration in social interactions) 1/1   Recent (last 2 weeks) or current physical aggression in the ED 0/1   Restraints or seclusion episode in ED 0/1   Verbal aggression, agitation, yelling, etc., while in the ED 0/1   Active psychosis with psychomotor agitation or catatonia 0/1   Need for constant or near constant redirection (from leaving, from others, etc).  0/1   Intrusive or disruptive behaviors 0/1   TOTAL 2

## 2025-02-17 NOTE — ED NOTES
Patient arrived unit at 1145 accompanied by ED personnel in a w/c. Patient's affect is bright upon approach. Patient was oriented to the unit. Pulse elevated, all other vital signs within normal limits. Patient is here for increase suicidal thoughts with no plan or intent at this time. Patient reports increased stressors and inability to focus on a safety plan. Patient has anxiety, depression, increase panic attacks. Patient endorsed anxiety of 6, depression 6 . Denies HI/SIB/AH/VH.  Minimal pain on IV site with a rating of 2/10. Writer offered patient some toiletries for self care. Patient refused an offer for snacks but did take some water. Pressure  bandaid on right IV site to prevent bleeding. No behavior or safety concerns at this time. Patient contracted for safety in the unit. Will continue to monitor.

## 2025-02-17 NOTE — ED NOTES
IP MH Referral Acuity Rating Score (RARS)    LMHP complete at referral to IP MH, with DEC; and, daily while awaiting IP MH placement. Call score to PPS.  CRITERIA SCORING   New 73 HH and Involuntary for IP MH (not adolescent) 0/3   Boarding over 24 hours 1/1   Vulnerable adult at least 55+ with multiple co morbidities; or, Patient age 11 or under 0/1   Suicide ideation without relief of precipitating factors 1/1   Current plan for suicide 0/1   Current plan for homicide 0/1   Imminent risk or actual attempt to seriously harm another without relief of factors precipitating the attempt 0/1   Severe dysfunction in daily living (ex: complete neglect for self care, extreme disruption in vegetative function, extreme deterioration in social interactions) 1/1   Recent (last 2 weeks) or current physical aggression in the ED 0/1   Restraints or seclusion episode in ED 0/1   Verbal aggression, agitation, yelling, etc., while in the ED 0/1   Active psychosis with psychomotor agitation or catatonia 0/1   Need for constant or near constant redirection (from leaving, from others, etc).  0/1   Intrusive or disruptive behaviors 0/1   TOTAL 3        Cosentyx Counseling:  I discussed with the patient the risks of Cosentyx including but not limited to worsening of Crohn's disease, immunosuppression, allergic reactions and infections.  The patient understands that monitoring is required including a PPD at baseline and must alert us or the primary physician if symptoms of infection or other concerning signs are noted.

## 2025-02-17 NOTE — TELEPHONE ENCOUNTER
S: [facility] , DEC  Shahrzad  calling at 10:09 PM about a 24 year old/Female presenting with worsening anxiety., depression, eating disorder struggles, and SI with method of jumping off balcony or slitting wrists but no plans or intent. Hx of SI struggles w     B: Pt arrived via Self . Presenting problem, stressors: recently lost job, financial stressors, recently evicted, and recent relationship issues    Pt affect in ED: Depressed  Pt Dx: Major Depressive Disorder, Generalized Anxiety Disorder, and PTSD  Previous IPMH hx? Yes: years ago as teenager unclear timeline  Pt endorses SI, no plan   Hx of suicide attempt? Yes: once as teenager did not elaborate  Pt has a remote hx of SIB via cutting, but hasn't in years  Pt denies HI   Pt denies hallucinations .   Pt RARS Score: 2    Hx of aggression/violence, sexual offenses, legal concerns, Epic care plan? describe: No  Current concerns for aggression this visit? No  Does pt have a history of Civil Commitment? No  Is Pt their own guardian? Yes    Pt is prescribed medication. Is patient medication compliant? Yes  Pt endorses OP services: Psychiatrist  CD concerns: Actively using/consuming thc  Acute or chronic medical concerns: No  Does Pt present with specific needs, assistive devices, or exclusionary criteria? None      Pt is ambulatory  Pt is able to perform ADLs independently      A: Pt to be reviewed for [facility] admission. Pt is Voluntary  Preferred placement: [facility]    COVID Symptoms: No  If yes, COVID test required   Utox: Positive for amphetamine, thc    CMP: Abnormalities: Glucose 113  CBC: WNL  HCG: Negative    R: Patient cleared and ready for behavioral bed placement: Yes  Pt placed on [facility] worklist? Yes    Does Patient need a Transfer Center request created? No, Pt is located within [facility] ED, [facility] ED, or [facility]

## 2025-02-17 NOTE — PLAN OF CARE
Nevaeh Mccann  February 16, 2025  Plan of Care Hand-off Note     Patient Recommended Care Path: inpatient mental health    Clinical Substantiation:  Nevaeh presents to the ED due to increased anxiety, depression and suicidal ideation. Per triage, patient has been having increased panic attacks, suicidal thoughts but no plan, having a hard time sleeping and eating. Patient reports that she has been dealing with depression and anxiety since she was a teenager. Patient reports that the symptoms have been well managed with medications. However, in the past 1-2 years, new stressors have exacerbated the symptoms. Patient currently reports active suicidal thoughts with methods but denies intention and planning. Patient expressed feeling unsafe and uncertain about her ability to safety plan. Patient also reports increased urges to engage in NSSIB of cutting. Patient reports that she uses Cannabis/THC on a regular basis and in the past few months, her use have increased to multiple times a day. Regarding current mental health supports, patient reports that she works with a psychiatrist and she takes all prescribed psychotropic medications. Writer and patient explored treatment options and patient advocated for inpatient hospitalization.     After therapeutic assessment, intervention and aftercare planning by ED care team and LM and in consultation with attending provider, the patient's circumstances and mental state were appropriate for [facility] for safety and symptoms stabilization.    Goals for crisis stabilization:  improvement in SI and ability to safety plan.    Treatment Objectives Addressed:  identifying and practicing coping strategies, rapport building, assessing safety, identifying treatment goals, exploring obstacles to safety in the community, identifying additional supports, identifying an appropriate aftercare plan    Therapeutic Interventions:  Identified and practiced coping skills.    The  follow up action still needed prior to discharge:  Safety planning    Patient coping skills attempted to reduce the crisis:  Come to the ED.    Severe psychiatric, behavioral or other comorbid conditions are appropriate for management at inpatient mental health as indicated by at least one of the following: Symptoms of impact to function  Severe dysfunction in daily living is present as indicated by at least one of the following: Other evidence of severe dysfunction  Situation and expectations are appropriate for inpatient care: Biopsychosocial stresses potentially contributing to clinical presentation (co morbidities) have been assessed and are absent or manageable at proposed level of care  Inpatient mental health services are necessary to meet patient needs and at least one of the following: Specific condition related to admission diagnosis is present and judged likely to further improve at proposed level of care      Collateral contact information:   Nikki Mccann (Mother) [phone]      Legal Status: Voluntary/Patient has signed consent for treatment                                                   Reviewed court records: yes     Psychiatry Consult: Patient has Psychiatry Consult Order    Shahrzad Hernandez

## 2025-02-17 NOTE — CONSULTS
"Diagnostic Evaluation Consultation  Crisis Assessment    Patient Name: Nevaeh Mccann  Age:  24 year old  Legal Sex: female  Gender Identity: female  Pronouns:   Race: White  Ethnicity: Not  or   Language: English      Patient was assessed: In person   Crisis Assessment Start Date: 02/16/25  Crisis Assessment Start Time: 1850  Crisis Assessment Stop Time: 1925  Patient location: [facility] Emergency Department                             URE-A    Referral Data and Chief Complaint  Nevaeh Mccann presents to the ED by  self. Patient is presenting to the ED for the following concerns: Significant behavioral change, Anxiety, Depression, Suicidal ideation, Worsening psychosocial stress. Factors that make the mental health crisis life threatening or complex are: Prior mental health diagnoses including MDD, ABISAI, ADHD, PTSD; psychosocial stressors (interpersonal relationship difficulty, recent job loss, financial stressors). Nevaeh Mccann is a 24 year old female who presents to the ED due to increased anxiety, depression and suicidal ideation. Per triage, patient has been having increased panic attacks, suicidal thoughts but no plan, having a hard time sleeping and eating.     Throughout this assessment, the patient is alert, oriented X4, calm and cooperative. Patient reports that she has been dealing with depression and anxiety since she was a teenager. Patient reports that the symptoms have been well managed with medications. However, in the past 1-2 years, new stressors have exacerbated the symptoms. Additionally, patient have been dealing with some symptoms of disordered eating and she reportedly is starting a partial hospitalization program with the [facility] in about 2 weeks. Patient reports \"I have been trying my best to deal with all of this but it seems like things are getting worst and I can't get a handle on it.\" Patient reports that she finally decided to come to " "the ED because in the past week, she has been dealing with worsening suicidal thoughts. Patient explained that she dealt with SI in her teens year, had a suicide attempt, and one subsequent episode of inpatient hospitalization. Patient states \"I dealt with it. I know what's it like. Now, I'm not really planning on how to do it but automatic thoughts of jumping off the balcony or slitting my wrist just pop up because those were my methods in the past.\" Patient currently reports active suicidal thoughts with methods but denies intention and planning. Patient expressed feeling unsafe and uncertain about her ability to safety plan. Patient also reports increased urges to engage in NSSIB of cutting. Patient denies symptoms significant of psychosis or jacquie. When asked about substance use, patient reports that she uses Cannabis/THC on a regular basis and in the past few months, her use have increased to multiple times a day. Regarding current mental health supports, patient reports that she works with a psychiatrist and she takes all prescribed psychotropic medications.    Informed Consent and Assessment Methods  Explained the crisis assessment process, including applicable information disclosures and limits to confidentiality, assessed understanding of the process, and obtained consent to proceed with the assessment.  Assessment methods included conducting a formal interview with patient, review of medical records, collaboration with medical staff, and obtaining relevant collateral information from family and community providers when available : done     History of the Crisis   Patient has prior mental health diagnoses including MDD, ABISAI, ADHD, and PTSD. Patient reports prior suicide attempt as a teenager. Patient has prior IP mental health when she was a teenager (patient unable to recall exact time.)    Brief Psychosocial History  Children no  Support System:  Significant Other, Parent(s)  Employment Status:  " unemployed  Source of Income:  unable to assess  Financial Environmental Concerns:  unemployed  Current Hobbies:  group/social activities, reading, television/movies/videos, music  Barriers in Personal Life:  financial concerns, lack of companionship, lack of motivation    Significant Clinical History  Current Anxiety Symptoms:  anxious, panic attack, racing thoughts, excessive worry, shortness of breath or racing heart  Current Depression/Trauma:  apathy, avoidance, sense of doom, withdrawl/isolation, irritable, thoughts of death/suicide, hopelessness  Current Somatic Symptoms:  racing thoughts, excessive worry, anxious, shortness of breath or racing heart  Current Psychosis/Thought Disturbance:  impulsive, inattentive  Current Eating Symptoms:  loss of appetite  Chemical Use History:  Alcohol: None  Benzodiazepines: None  Opiates: None  Cocaine: None  Marijuana: Daily  Last Use:: 02/16/25  Other Use: None   Past diagnosis:  Anxiety Disorder, ADHD, Depression, PTSD, Eating Disorder, Suicide attempt(s)  Family history:  No known history of mental health or chemical health concerns  Past treatment:  Individual therapy, Psychiatric Medication Management, Primary Care, Partial Hospitalization, Inpatient Hospitalization  Details of most recent treatment:  Patient takes psychotropic medications as prescribed. Psych meds are managed by a psychiatrist. Most recent medication change was approximately 2 months ago.  Other relevant history:  Patient reports she was living on her own but had recently moved back to [place] with parents house due to financial issues. Patient reportedly lost her job recently and is dealing with some financial difficulties.    Have there been any medication changes in the past two weeks:  no       Is the patient compliant with medications:  yes        Collateral Information  Is there collateral information: No (Writer called patient's emergency Nikki verma, but unable to reach.)      Risk  Assessment  [place] Suicide Severity Rating Scale Full Clinical Version:  Suicidal Ideation  Q6 Suicide Behavior (Lifetime): yes    [place] Suicide Severity Rating Scale Recent:   Suicidal Ideation (Recent)  Q1 Wished to be Dead (Past Month): yes  Q2 Suicidal Thoughts (Past Month): yes  Q3 Suicidal Thought Method: yes  Q4 Suicidal Intent without Specific Plan: no  Q5 Suicide Intent with Specific Plan: no  If yes to Q6, within past 3 months?: yes  Level of Risk per Screen: high risk  Intensity of Ideation (Recent)  Most Severe Ideation Rating (Past 1 Month): 3  Frequency (Past 1 Month): 2-5 times in week  Duration (Past 1 Month): Fleeting, few seconds or minutes  Controllability (Past 1 Month): Can control thoughts with little difficulty  Deterrents (Past 1 Month): Deterrents probably stopped you  Reasons for Ideation (Past 1 Month): Equally to get attention, revenge, or a reaction from others and to end/stop the pain  Suicidal Behavior (Recent)  Actual Attempt (Past 3 Months): No  Total Number of Actual Attempts (Past 3 Months): 0  Actual Attempt Description (Past 3 Months): N/A  Has subject engaged in non-suicidal self-injurious behavior? (Past 3 Months): No  Interrupted Attempts (Past 3 Months): No  Total Number of Interrupted Attempts (Past 3 Months): 0  Interrupted Attempt Description (Past 3 Months): N/A  Aborted or Self-Interrupted Attempt (Past 3 Months): No  Total Number of Aborted or Self-Interrupted Attempts (Past 3 Months): 0  Aborted or Self-Interrupted Attempt Description (Past 3 Months): N/A  Preparatory Acts or Behavior (Past 3 Months): No  Total Number of Preparatory Acts (Past 3 Months): 0  Preparatory Acts or Behavior Description (Past 3 Months): N/A    Environmental or Psychosocial Events: challenging interpersonal relationships, work or task failure, helplessness/hopelessness, unemployment/underemployment, excessive debt, poor finances, other life stressors  Protective Factors: Protective  Factors: strong bond to family unit, community support, or employment, lives in a responsibly safe and stable environment, help seeking, supportive ongoing medical and mental health care relationships, optimistic outlook - identification of future goals, reality testing ability, responsibilities and duties to others, including pets and children    Does the patient have thoughts of harming others? Feels Like Hurting Others: no  Previous Attempt to Hurt Others: no  Is the patient engaging in sexually inappropriate behavior?: no  Does Patient have a known history of aggressive behavior: No    Is the patient engaging in sexually inappropriate behavior?  no        Mental Status Exam   Affect: Appropriate  Appearance: Appropriate  Attention Span/Concentration: Attentive  Eye Contact: Variable    Fund of Knowledge: Appropriate   Language /Speech Content: Fluent  Language /Speech Volume: Normal  Language /Speech Rate/Productions: Normal  Recent Memory: Intact  Remote Memory: Intact  Mood: Anxious, Sad  Orientation to Person: Yes   Orientation to Place: Yes  Orientation to Time of Day: Yes  Orientation to Date: Yes     Situation (Do they understand why they are here?): Yes  Psychomotor Behavior: Normal  Thought Content: Clear  Thought Form: Goal Directed    Medication  Psychotropic medications:   Medication Orders - Psychiatric (From admission, onward)      None             Current Care Team  Patient Care Team:  Alma Jamison MD as PCP - General    Diagnosis  Patient Active Problem List   Diagnosis Code    MDD (major depressive disorder) F32.9    Behavior concern R46.89    Suicide and self-inflicted injury (H) X83.8XXA    Suicidal ideation R45.851    Tylenol overdose T39.1X1A    Suicide attempt (H) T14.91XA    Genital warts A63.0    Major depressive disorder, recurrent episode, moderate (H) F33.1       Primary Problem This Admission  Active Hospital Problems    *Major depressive disorder, recurrent episode, moderate  (H)        Clinical Summary and Substantiation of Recommendations   Clinical Substantiation:  Nevaeh presents to the ED due to increased anxiety, depression and suicidal ideation. Per triage, patient has been having increased panic attacks, suicidal thoughts but no plan, having a hard time sleeping and eating. Patient reports that she has been dealing with depression and anxiety since she was a teenager. Patient reports that the symptoms have been well managed with medications. However, in the past 1-2 years, new stressors have exacerbated the symptoms. Patient currently reports active suicidal thoughts with methods but denies intention and planning. Patient expressed feeling unsafe and uncertain about her ability to safety plan. Patient also reports increased urges to engage in NSSIB of cutting. Patient reports that she uses Cannabis/THC on a regular basis and in the past few months, her use have increased to multiple times a day. Regarding current mental health supports, patient reports that she works with a psychiatrist and she takes all prescribed psychotropic medications. Writer and patient explored treatment options and patient advocated for inpatient hospitalization.     After therapeutic assessment, intervention and aftercare planning by ED care team and LM and in consultation with attending provider, the patient's circumstances and mental state were appropriate for [facility] for safety and symptoms stabilization.    Goals for crisis stabilization:  improvement in SI and ability to safety plan.    Next steps for Care Team:  Safety planning    Treatment Objectives Addressed:  identifying and practicing coping strategies, rapport building, assessing safety, identifying treatment goals, exploring obstacles to safety in the community, identifying additional supports, identifying an appropriate aftercare plan    Therapeutic Interventions:  Identified and practiced coping skills.    The follow up action  still needed prior to discharge:  Safety planning    Patient coping skills attempted to reduce the crisis:  Come to the ED.    Disposition  Recommended referrals: Medication Management, Individual Therapy, Programmatic Care        Reviewed case and recommendations with attending provider. Attending Name: Dr. Vance       Attending concurs with disposition: yes       Patient and/or validated legal guardian concurs with disposition:   yes       Final disposition:  inpatient mental health        Severe psychiatric, behavioral or other comorbid conditions are appropriate for management at inpatient mental health as indicated by at least one of the following: Symptoms of impact to function  Severe dysfunction in daily living is present as indicated by at least one of the following: Other evidence of severe dysfunction  Situation and expectations are appropriate for inpatient care: Biopsychosocial stresses potentially contributing to clinical presentation (co morbidities) have been assessed and are absent or manageable at proposed level of care  Inpatient mental health services are necessary to meet patient needs and at least one of the following: Specific condition related to admission diagnosis is present and judged likely to further improve at proposed level of care      Legal status: Voluntary/Patient has signed consent for treatment                                                                        Reviewed court records: yes       Assessment Details   Total duration spent with the patient: 30 min     CPT code(s) utilized: 66639 - Psychotherapy for Crisis - 60 (30-74*) min    Shahrzad Hernandez Psychotherapist  [facility] - Triage & Transition Services  Callback: [phone]

## 2025-02-17 NOTE — ED NOTES
Pt is expressing that she wants to leave. [facility] assessors were notified, they were not able to see pt. Will contact Doc.

## 2025-02-17 NOTE — TELEPHONE ENCOUNTER
R: [place]  Access Inpatient Adult Bed Call Log  2/17/25 @ 1:00am   Intake has called facilities that have not updated their bed status within the last 12 hours.     *METRO:  [place] -- [facility]: @ capacity.  [facility]: POSTING 3 BEDS. No reviews overnight. #[phone]  [place] -- [facility]: @ cap per website. Low acuity. #[phone]  [place] -- [facility]: @ cap per website. Low acuity only. #[phone]  [place] -- [facility]: @ cap per website. #[phone]  [facility]: @ cap per website. #[phone]  [facility]/ beds: POSTING 6 BEDS, no reviews tonight- Ages 18-35, Voluntary only, NO aggression/physical or sexual assault/violence hx, or drug abuse. Negative Covid. #[phone]  [place] -- [facility]: @ cap per website. #[phone]  [place] -- [facility]: @ cap per website. #[phone]  [place] -- [facility]: @ cap per website. No reviews overnight. #[phone]     Pt remains on waitlist pending appropriate placement availability.

## 2025-02-17 NOTE — MEDICATION SCRIBE - ADMISSION MEDICATION HISTORY
Medication Scribe Admission Medication History    Admission medication history is complete. The information provided in this note is only as accurate as the sources available at the time of the update.    Information Source(s): Patient via in-person    Pertinent Information: Patient reports self management of medications.     Patient reports no longer taking: Pepcid, Carafate    Changes made to PTA medication list:  Added: Tums, Melatonin, Cymbalta, Hydroxyzine, Zofran, Compazine, Kyleena   Deleted:  Pepcid, Carafate  Changed: Gabapentin to 600 BID (per patient)    Allergies reviewed with patient and updates made in EHR: yes    Medication History Completed By: Jag Bundy 2/17/2025 8:46 AM    PTA Med List   Medication Sig Last Dose/Taking    acetaminophen (TYLENOL) 500 MG tablet Take 500-1,000 mg by mouth every 6 hours as needed for mild pain Taking As Needed    amphetamine-dextroamphetamine (ADDERALL XR) 25 MG 24 hr capsule Take 1 capsule by mouth every morning. 2/16/2025 Morning    amphetamine-dextroamphetamine (ADDERALL) 10 MG tablet Take 10 mg by mouth daily at 2 pm. 2/16/2025 at  2:00 PM    busPIRone (BUSPAR) 10 MG tablet Take 10 mg by mouth 2 times daily. 2/16/2025    calcium carbonate (TUMS) 500 MG chewable tablet Take 1 chew tab by mouth daily as needed for heartburn. Taking As Needed    DULoxetine (CYMBALTA) 60 MG capsule Take 60 mg by mouth every morning. 2/16/2025 Morning    gabapentin (NEURONTIN) 300 MG capsule Take 600 mg by mouth 2 times daily. 2/16/2025 Evening    hydrOXYzine HCl (ATARAX) 25 MG tablet Take 25 mg by mouth 2 times daily as needed for anxiety. Taking As Needed    ibuprofen (ADVIL/MOTRIN) 200 MG capsule Take 200 mg by mouth every 4 hours as needed for fever Taking As Needed    levonorgestrel (KYLEENA) 19.5 MG IUD 1 each by Intrauterine route See Admin Instructions. Inserted 7/14/2022 Taking    Melatonin 10 MG TABS tablet Take 10 mg by mouth nightly as needed for sleep. Taking As Needed     ondansetron (ZOFRAN ODT) 4 MG ODT tab Take 4 mg by mouth every 8 hours as needed. Taking As Needed    prochlorperazine (COMPAZINE) 10 MG tablet Take 10 mg by mouth every 8 hours as needed. Taking As Needed    Vitamin D3 (CHOLECALCIFEROL) 25 mcg (1000 units) tablet Take 1 tablet by mouth every morning. 2/16/2025 Morning

## 2025-02-17 NOTE — TELEPHONE ENCOUNTER
R: [facility] Access Inpatient Bed Call Log 2/17/25 at 7:25 AM: Intake has called facilities that have not updated the bed status within the last 12 hours.              [facility] is at capacity.            [facility] is posting 3 beds. [phone]  [phone]  Per call @12:24 PM no beds available  [facility] is posting 0 beds. Negative covid required.    [facility] is posting 0 beds. Neg covid. No high school/[facility]-psych. [phone] Per Call @ 7:56AM  Call back at around 930am, currently at capacity     [facility] is posting 0 beds. [phone]   [facility] is posting 0 beds. [phone]    [facility] is posting 0 beds. Negative covid. [phone] Per Call @ 8:04AM with Arnold, 4YA Beds are available. 6AD, No Child Beds.   [facility] ([facility]) is posting 0 beds [phone].       Pt remains on the work list pending appropriate bed availability.      [facility] Access Inpatient Bed Call Log 2/17/25  @ 3:00 PM:   Intake has called facilities that have not updated the bed status within the last 12 hours.              [facility] is at capacity.         [facility] is posting 3 beds. [phone]  Per call @12:24 PM no beds available  [facility] is posting 0 beds. Negative covid required.    [facility] is posting 0 beds. Neg covid. No high school/[facility]-psych. [phone] Per Call @ 7:56AM  Call back at around 930am, currently at capacity     [facility] is posting 0 beds. [phone]   [facility] is posting 0 beds. [phone]    [facility] is posting 6 beds. Negative covid. [phone] Per Call @ 8:04AM with Arnold, 4YA Beds are available. 6AD, No Child Beds.   [facility] ([facility]) is posting 0 beds [phone].       Pt remains on the work list pending appropriate bed availability.

## 2025-02-17 NOTE — CONSULTS
[facility]  Department of Psychiatry  Consultation note    Nevaeh Mccann MRN: [id number]   Age: 24 year old YOB: 2000     History     Chief Complaint   Patient presents with    Psychiatric Evaluation    Suicidal     HPI  Nevaeh Mccann is a 24 year old female with history of depression, polysubstance abuse, eating disorder who came in yesterday with anxiety and SI without plan or intent. She has been feeling more anxious and not sleeping well and has had thoughts about cutting. She has been having panic attacks. She says she has been having trouble sleeping and eating consistently at the right times. Depression and anxiety are longstanding, she feels that over the last 2 years she has had more stressors. [facility]. She reports using THC multiple times every day.  Denies other drug or alcohol use. UDS + THC and amphetamines (she is prescribed adderall). Current psychiatric medications are adderall xr 25 mg daily, adderall 10 mg at 2 PM, buspar 10 mg BID, cymbalta 60 mg daily, gabapentin 600 mg BID, hydroxyzine prn, melatonin prn. She reports medication compliance. She denies any recent medication changes. She feels that her medications are working for her and does feel she needs any change in them.     Past Medical History  Past Medical History:   Diagnosis Date    Anxiety     Depression     IUD (intrauterine device) in place 08/2017    Kyleena IUD    Polysubstance abuse (H)     started at age 15 years    PTSD (post-traumatic stress disorder)     Self-injurious behavior      Past Surgical History:   Procedure Laterality Date    NO HISTORY OF SURGERY      TONSILLECTOMY       acetaminophen (TYLENOL) 500 MG tablet  amphetamine-dextroamphetamine (ADDERALL XR) 25 MG 24 hr capsule  amphetamine-dextroamphetamine (ADDERALL) 10 MG tablet  busPIRone (BUSPAR) 10 MG tablet  calcium carbonate (TUMS) 500 MG chewable  "tablet  DULoxetine (CYMBALTA) 60 MG capsule  gabapentin (NEURONTIN) 300 MG capsule  hydrOXYzine HCl (ATARAX) 25 MG tablet  ibuprofen (ADVIL/MOTRIN) 200 MG capsule  levonorgestrel (KYLEENA) 19.5 MG IUD  Melatonin 10 MG TABS tablet  ondansetron (ZOFRAN ODT) 4 MG ODT tab  prochlorperazine (COMPAZINE) 10 MG tablet  Vitamin D3 (CHOLECALCIFEROL) 25 mcg (1000 units) tablet      Allergies   Allergen Reactions    Azithromycin Swelling    Penicillins      Tongue swelling    Sulfamethoxazole-Trimethoprim      Family History  Family History   Problem Relation Age of Onset    Diabetes Father     Asthma Father     Arrhythmia Father         Atrial Fibrillation    Migraines Mother     Fibromyalgia Mother     Alcoholism Maternal Grandfather     Brain Cancer Maternal Grandfather     Suicide Maternal Uncle         Great Uncle - completed suicide via GSW     Social History   Social History     Tobacco Use    Smoking status: Former     Types: Other    Smokeless tobacco: Never   Substance Use Topics    Alcohol use: Yes     Alcohol/week: 1.0 standard drink of alcohol     Types: 1 Glasses of wine per week     Comment: rarely    Drug use: Yes     Types: Marijuana     Comment: quit around 6/1/18; over the weekend, has tried LSD and Xanax.            Review of Systems  A medically appropriate review of systems was performed with pertinent positives and negatives noted in the HPI, and all other systems negative.    Physical Examination   BP: 128/77  Pulse: (S) (!) 128  Temp: 98  F (36.7  C)  Resp: 16  Height: 160 cm (5' 3\")  Weight: 58.2 kg (128 lb 3.2 oz)  SpO2: 98 %    Physical Exam  General: Appears stated age.   Neuro: Alert and fully oriented. Extremities appear to demonstrate normal strength on visual inspection.   Integumentary/Skin: no rash visualized, normal color    Psychiatric Examination   Appearance: awake, alert, adequately groomed, and casually dressed  Attitude:  cooperative  Eye Contact:  good  Mood:  good  Affect:   bright, " normal range  Speech:  clear, coherent  Psychomotor Behavior:  no evidence of tardive dyskinesia, dystonia, or tics  Thought Process:  logical, linear, and goal oriented  Associations:  no loose associations  Thought Content:   +SI without plan or intent, denies HI/AVH, does not respond to internal stimuli, no apparent delusions or paranoia  Insight:   intact  Judgement:  intact  Oriented to:  time, person, and place  Attention Span and Concentration:  intact  Recent and Remote Memory:  intact  Language: able to name/identify objects without impairment  Fund of Knowledge: intact with awareness of current and past events    ED Course        Labs Ordered and Resulted from Time of ED Arrival to Time of ED Departure   COMPREHENSIVE METABOLIC PANEL - Abnormal       Result Value    Sodium 138      Potassium 4.0      Carbon Dioxide (CO2) 24      Anion Gap 12      Urea Nitrogen 10.3      Creatinine 0.59      GFR Estimate >90      Calcium 10.0      Chloride 102      Glucose 113 (*)     Alkaline Phosphatase 53      AST 19      ALT 13      Protein Total 7.3      Albumin 4.8      Bilirubin Total 0.2     ROUTINE UA WITH MICROSCOPIC REFLEX TO CULTURE - Abnormal    Color Urine Straw      Appearance Urine Clear      Glucose Urine Negative      Bilirubin Urine Negative      Ketones Urine Negative      Specific Gravity Urine 1.005      Blood Urine Negative      pH Urine 6.5      Protein Albumin Urine Negative      Urobilinogen Urine Normal      Nitrite Urine Negative      Leukocyte Esterase Urine Small (*)     Bacteria Urine Few (*)     RBC Urine 2      WBC Urine 3      Squamous Epithelials Urine 1      Transitional Epithelials Urine <1     URINE DRUG SCREEN PANEL - Abnormal    Amphetamines Urine Screen Positive (*)     Barbituates Urine Screen Negative      Benzodiazepine Urine Screen Negative      Cannabinoids Urine Screen Positive (*)     Cocaine Urine Screen Negative      Fentanyl Qual Urine Screen Negative      Opiates Urine  Screen Negative      PCP Urine Screen Negative     HCG QUALITATIVE URINE - Normal    hCG Urine Qualitative Negative     CBC WITH PLATELETS AND DIFFERENTIAL    WBC Count 10.7      RBC Count 4.82      Hemoglobin 14.9      Hematocrit 43.2      MCV 90      MCH 30.9      MCHC 34.5      RDW 11.4      Platelet Count 266      % Neutrophils 76      % Lymphocytes 19      % Monocytes 5      % Eosinophils 0      % Basophils 0      % Immature Granulocytes 0      NRBCs per 100 WBC 0      Absolute Neutrophils 8.1      Absolute Lymphocytes 2.0      Absolute Monocytes 0.5      Absolute Eosinophils 0.0      Absolute Basophils 0.0      Absolute Immature Granulocytes 0.0      Absolute NRBCs 0.0         Assessments & Plan (with Medical Decision Making)   Patient presenting with increasing anxiety and panic attacks as well as SI without plan or intent. Nursing notes reviewed noting no acute issues. She feels she needs to better regulate her eating and sleeping, she is not interested in medication changes at this time. She was put on the inpatient admission list last night. She would likely benefit from IOP/PHP services.     I have reviewed the assessment completed by the [facility].     Preliminary diagnosis:    ICD-10-CM    1. ABISAI        2. MDD        3. Eating disorder, unspecified type  F50.9            Treatment Plan:  -Patient has been put on the inpatient admission list  -Do not recommend any medication changes at this time.   -I ordered her outpatient cymbalta, other psychiatric meds were previously ordered    --  Oliva Langford MD   [facility] EMERGENCY DEPARTMENT

## 2025-02-18 ENCOUNTER — TELEPHONE (OUTPATIENT)
Dept: BEHAVIORAL HEALTH | Facility: CLINIC | Age: 25
End: 2025-02-18
Payer: COMMERCIAL

## 2025-02-18 VITALS
DIASTOLIC BLOOD PRESSURE: 77 MMHG | WEIGHT: 128.2 LBS | HEIGHT: 63 IN | HEART RATE: 97 BPM | TEMPERATURE: 98.1 F | OXYGEN SATURATION: 99 % | BODY MASS INDEX: 22.71 KG/M2 | RESPIRATION RATE: 17 BRPM | SYSTOLIC BLOOD PRESSURE: 117 MMHG

## 2025-02-18 PROCEDURE — 99207 PR NO BILLABLE SERVICE THIS VISIT: CPT | Performed by: PSYCHIATRY & NEUROLOGY

## 2025-02-18 PROCEDURE — 250N000013 HC RX MED GY IP 250 OP 250 PS 637: Performed by: FAMILY MEDICINE

## 2025-02-18 PROCEDURE — 250N000013 HC RX MED GY IP 250 OP 250 PS 637: Performed by: PSYCHIATRY & NEUROLOGY

## 2025-02-18 RX ADMIN — Medication 1000 UNITS: at 08:43

## 2025-02-18 RX ADMIN — DEXTROAMPHETAMINE SULFATE, DEXTROAMPHETAMINE SACCHARATE, AMPHETAMINE SULFATE AND AMPHETAMINE ASPARTATE 20 MG: 5; 5; 5; 5 CAPSULE, EXTENDED RELEASE ORAL at 08:43

## 2025-02-18 RX ADMIN — DEXTROAMPHETAMINE SACCHARATE, AMPHETAMINE ASPARTATE, DEXTROAMPHETAMINE SULFATE, AMPHETAMINE SULFATE TABLETS, 10 MG,CLL 10 MG: 2.5; 2.5; 2.5; 2.5 TABLET ORAL at 13:08

## 2025-02-18 RX ADMIN — BUSPIRONE HYDROCHLORIDE 10 MG: 10 TABLET ORAL at 10:33

## 2025-02-18 RX ADMIN — DULOXETINE HYDROCHLORIDE 60 MG: 60 CAPSULE, DELAYED RELEASE ORAL at 08:42

## 2025-02-18 RX ADMIN — GABAPENTIN 900 MG: 300 CAPSULE ORAL at 08:42

## 2025-02-18 RX ADMIN — DEXTROAMPHETAMINE SULFATE, DEXTROAMPHETAMINE SACCHARATE, AMPHETAMINE SULFATE AND AMPHETAMINE ASPARTATE 5 MG: 1.25; 1.25; 1.25; 1.25 CAPSULE, EXTENDED RELEASE ORAL at 09:51

## 2025-02-18 ASSESSMENT — ACTIVITIES OF DAILY LIVING (ADL)
ADLS_ACUITY_SCORE: 41

## 2025-02-18 ASSESSMENT — COLUMBIA-SUICIDE SEVERITY RATING SCALE - C-SSRS
SUICIDE, SINCE LAST CONTACT: NO
6. HAVE YOU EVER DONE ANYTHING, STARTED TO DO ANYTHING, OR PREPARED TO DO ANYTHING TO END YOUR LIFE?: NO
TOTAL  NUMBER OF INTERRUPTED ATTEMPTS SINCE LAST CONTACT: NO
ATTEMPT SINCE LAST CONTACT: NO
TOTAL  NUMBER OF ABORTED OR SELF INTERRUPTED ATTEMPTS SINCE LAST CONTACT: NO
1. SINCE LAST CONTACT, HAVE YOU WISHED YOU WERE DEAD OR WISHED YOU COULD GO TO SLEEP AND NOT WAKE UP?: NO
2. HAVE YOU ACTUALLY HAD ANY THOUGHTS OF KILLING YOURSELF?: NO

## 2025-02-18 NOTE — TELEPHONE ENCOUNTER
R:    Intake received a message via EC/EmPath chat that pt will be discharged home can be removed from [facility] work list at this time.       3:00 PM [facility] admission cancelled.     Work list updated. Behavioral intake no longer following.

## 2025-02-18 NOTE — TELEPHONE ENCOUNTER
4:56 AM - [facility] accepted pt for MH placement. [facility] advised pt can arrive after 8:00 AM.     R: [facility] / Antoni / FELISA     5:08 AM - Notified unit RN Diya and provided nurse to nurse contact information.        Nghia galvez

## 2025-02-18 NOTE — PROGRESS NOTES
"Triage and Transition Services Extended Care Reassessment     Patient: Nevaeh goes by \"Nevaeh,\" uses she/her pronouns  Date of Service: February 18, 2025  Site of Service: [facility] Emergency Department                             [id number]  Patient was seen yes  Mode of Assessment: In person     Reason for Reassessment:      History of Patient's Original Emergency Room Encounter: Patient has prior mental health diagnoses including MDD, ABISAI, ADHD, and PTSD. Patient reports prior suicide attempt as a teenager. Patient has prior IP mental health when she was a teenager (patient unable to recall exact time.)    Current Patient Presentation: Pt calm, cooperative now denying any SI or desire to be admitted to IP unit once accepted to facility. Eager to d/c home and follow up with OP services.    Presentation Summary: See above    Changes Observed Since Initial Assessment: decrease in presenting symptoms    Therapeutic Interventions Provided: Engaged in safety planning, Engaged in cognitive restructuring/ reframing, looked at common cognitive distortions and challenged negative thoughts.    Current Symptoms: anxious            Mental Status Exam   Affect: Appropriate  Appearance: Appropriate  Attention Span/Concentration: Attentive  Eye Contact: Engaged    Fund of Knowledge: Appropriate   Language /Speech Content: Fluent  Language /Speech Volume: Normal  Language /Speech Rate/Productions: Normal  Recent Memory: Intact  Remote Memory: Intact  Mood: Anxious  Orientation to Person: Yes   Orientation to Place: Yes  Orientation to Time of Day: Yes  Orientation to Date: Yes     Situation (Do they understand why they are here?): Yes  Psychomotor Behavior: Normal  Thought Content: Clear  Thought Form: Goal Directed, Intact    Treatment Objective(s) Addressed: rapport building, orienting the patient to therapy, identifying additional supports, identifying treatment goals, assessing safety, identifying an appropriate " aftercare plan, safety planning, processing feelings    Patient Response to Interventions: eager to participate, acceptance expressed    Progress Towards Goals:  Patient Reports Symptoms Are: improving  Patient Progress Toward Goals: is making progress  Comment: Able to safety plan, stable for OP follow up.  Next Step to Work Toward Discharge: patient ability to engage in safety planning    Case Management:      C-SSRS Since Last Contact:   1. Wish to be Dead (Since Last Contact): No  2. Non-Specific Active Suicidal Thoughts (Since Last Contact): No     Actual Attempt (Since Last Contact): No  Has subject engaged in non-suicidal self-injurious behavior? (Since Last Contact): No  Interrupted Attempts (Since Last Contact): No  Aborted or Self-Interrupted Attempt (Since Last Contact): No  Preparatory Acts or Behavior (Since Last Contact): No  Suicide (Since Last Contact): No     Calculated C-SSRS Risk Score (Since Last Contact): No Risk Indicated    Plan: Final Disposition / Recommended Care Path: discharge  Plan for Care reviewed with assigned Medical Provider: yes  Plan for Care Team Review: psych associate, provider  Comments: Dr. Solorzano  Patient and/or validated legal guardian concurs: yes  Clinical Substantiation: Nevaeh is now stabilized following time spent in [facility]. Accepted to [facility] facility today but is declining and wanting to d/c home. Does not meet critieria for 72HH as is no longer endorsing thoughts of suicide and expressing plans to work with OP team and [facility] for ongoing care. No longer at acute risk, stable for d/c, request honored.    Legal Status: Legal Status: Voluntary/Patient has signed consent for treatment    Session Status: Time session started: 1400  Time session ended: 1416  Session Duration (minutes): 16 minutes  Session Number: 1  Anticipated number of sessions or this episode of care: 1    Session Start Time: 1400  Session Stop Time: 1416  CPT codes: 70407 - Psychotherapy (with  patient) - 30 (16-37*) min  Time Spent: 16 minutes      CPT code(s) utilized: 04214 - Psychotherapy (with patient) - 30 (16-37*) min    Diagnosis:   Patient Active Problem List   Diagnosis Code    MDD (major depressive disorder) F32.9    Behavior concern R46.89    Suicide and self-inflicted injury (H) X83.8XXA    Suicidal ideation R45.851    Tylenol overdose T39.1X1A    Suicide attempt (H) T14.91XA    Genital warts A63.0    Major depressive disorder, recurrent episode, moderate (H) F33.1       Primary Problem This Admission: Active Hospital Problems    *Major depressive disorder, recurrent episode, moderate (H)        NATHAN Florence   Licensed Mental Health Professional (LMHP), Extended Care  [phone]

## 2025-02-18 NOTE — ED PROVIDER NOTES
"Emergency Department I-PASS Sign-out      Illness Severity: \"Watcher\"    Patient Summary:  24 year old female with pertinent PMH of depression and eating disorder who presented with increased suicidal ideation    ED Course/treatment plan: Voluntary admission to inpatient psych    Clinical Impression:  (R45.851) Suicidal ideation    (F41.0) Panic attack    (F50.9) Eating disorder, unspecified type      Edited by: Mike Vance MD at 2025 1252    Action List:  Tests to Follow-up:  None    Medications Reconciled/Ordered:  Yes    ED Mental Health Boarding Order Set Used for Diet/PRNs/Other:  Yes    DEC, Extended Care, Psych Consult Orders:  Extended Care and Psychiatry consult ordered.    Situational Awareness & Contingency Plannin Hour Hold Status:  Voluntary, would reassess if wanting to leave.  Active Orders  N/A    Disposition:  Admit/Transfer to Behavioral Health, medically clear for admit/transfer      Synthesis [employer] Events after sign-out:  Patient was seen and evaluated by behavioral health assessors and is requesting to discharge to home rather than proceed with inpatient mental health admission.  She denies any ongoing suicidal ideation and anxiety is well-controlled at this time.  Recommendation from behavioral health assessors is for discharge.  I did also see and evaluate the patient and she reports she is feeling safe without any suicidal thoughts.  She reports she has been able to eat and drink well in the emergency department.  She has ongoing outpatient follow-up for mental health and is [facility]ating [facility] the [facility].  She was seen by psychiatry yesterday without any recommendations for medication changes.  Will plan to discharge with close return precautions.      Marga Sykes MD   Emergency Medicine       Marga Sykes MD  [phone]    "

## 2025-02-18 NOTE — DISCHARGE INSTRUCTIONS
[place]: mobile crisis response  If you're in crisis or know someone who is, we can help.    The [place] mobile crisis team can come to where you are. [place] responds to anyone in [facility] who needs an urgent response--individuals, families or communities.    [place] has bi-lingual and bi-cultural staff available for face-to-face, phone, and video visits and uses interpreters when needed.     If the situation is life-threatening or you need immediate response, call 911.    All ages  We now have one number for all ages. Call [phone].     Crisis services  Available 24 hours a day, seven days a week, 365 days a year  Come to your home, school or other public place  Calm the situation and help you to decide what to do next  Offer other types of help depending on your situation  Talk through ways to support someone you know, who s in crisis.    [facility] Acute Psychiatric Services    [phone]    Services for adults, 24-hours a day, 7 days a week:  assessment and possible hospitalization for psychiatric emergencies. Emergency entrance at [address] and [facility].    FREE, ACCESSIBLE MENTAL HEALTH AND   CRISIS COUNSELING   Some in-person services now available (MWF 1-3 pm)  [facility] is a non-profit organization that has provided counseling services to people   with urgent needs and few options since 1969. Counseling is always FREE and ANONYMOUS, with   NO APPOINTMENT needed during clinic hours. All services are provided by professional clinicians  who are volunteers. ANYONE can use our services. Asking for help can be the healthiest thing you do!  In January 2023, we resumed in-person access during early afternoon clinics on Monday,   Wednesday and Friday, from 1-3 pm. All clinics are still no-appointment and still accessible virtually.   Evening clinics remain virtual.   CLINIC HOURS: (Hours will not change.)  Monday, Wednesday, Friday afternoons: 1 - 3  p.m. in-person and virtual (phone or internet).  Monday through Thursday evenings: 5:30 - 7:30 p.m. virtual only (phone or internet).  Please come, call or login to Zoom only during clinic hours. You will talk with the clinic receptionist   who will connect you with a counselor ASAP. Please be patient.  IN PERSON: Come to [address] [place] [address] on Monday, Wednesday or Friday from 1-3 p.m.   BY PHONE: During clinic hours, find a quiet place. Please don t phone while driving!  Call the [employer] phone number for the [place] - [phone]. When prompted by [employer], enter   the Meeting ID for the Walk[facility] clinic: [phone].   BY COMPUTER INTERNET: During clinic hours, go to: [email or web address]   Follow the on-screen instructions to open Zoom.  Click on the blue button to  Launch Meeting.   Next you will see  Please wait, the meeting host will let you in soon,  so please wait.   Turn your computer camera on.  Having trouble? Go to our website, [email or web address].

## 2025-02-18 NOTE — ED NOTES
"Pt was out in the milieu doing coloring and playing with peers. Pt's friend came to visit. Vital signs within normal limit. Denied SI/HI, Hallucinations/delusions. Denied pain. Around 2138 pt claimed to spoke to parents the phone . Noticed  pt crying and was tearful. She stated that she was \"having a manic attack\". Emotional support was provided. Pt was also saying \"I am going to be homeless\". Pt is worried about not having a place to stay. Reminded pt that she can speak to Dec assessors tomorrow and they can provided resources for her. Pt asked to speak to a friend on the phone and the pt phone was provided. Pt is now resting in her room.   "

## 2025-02-18 NOTE — ED NOTES
Pt has been calm and cooperative throughout this writer's shift. Pt has been complaint with medications. Ate breakfast and lunch.

## 2025-02-18 NOTE — TELEPHONE ENCOUNTER
R: [place]  Access Inpatient Bed Call Log 2/17/25  @ 3:00 PM:  Intake has called facilities that have not updated the bed status within the last 12 hours.              [facility] is at capacity.         [facility] is posting 3 beds. [phone]  Per call @12:24 PM no beds available  [facility] is posting 0 beds. Negative covid required.    [facility] is posting 0 beds. Neg covid. No high school/[facility]-psych. [phone] Per Call @ 7:56AM  Call back at around 930am, currently at capacity     [facility] is posting 0 beds. [phone]   [facility] is posting 0 beds. [phone]    [facility] is posting 6 beds. Negative covid. [phone] Per Call @ 8:04AM with Arnold, 4YA Beds are available. 6AD, No Child Beds.   [facility] is posting 0 beds [phone].    9:30 PM PPS contacted [facility] for review, faxed over clinical, pending cb.        Pt remains on the work list pending appropriate bed availability.

## 2025-02-18 NOTE — ED NOTES
Received phone call from [facility], inquiring about the patient current nutritional/fluids status. Per previous shift, patient consumed about 90% of her dinner.

## 2025-02-19 ENCOUNTER — TELEPHONE (OUTPATIENT)
Dept: BEHAVIORAL HEALTH | Facility: CLINIC | Age: 25
End: 2025-02-19
Payer: COMMERCIAL

## 2025-02-19 NOTE — ED NOTES
Pt discharged today via friend . Pt verbalized ready to be discharged. Denied all psych symptoms.  Discussed discharge summary with Pt and pt acknowledged understanding.

## 2025-02-19 NOTE — TELEPHONE ENCOUNTER
Triage and Transition Services- Patient Follow Up Call  Service Line Making Phone Call: Extended Care    Who did Writer Talk to: Patient    Details of Call: Writer LVM for pt with EC queue number for scheduling assistance as needed.    TAINA ASHLEY 2/19/2025 10:46 AM